# Patient Record
Sex: MALE | Race: WHITE | NOT HISPANIC OR LATINO | Employment: OTHER | URBAN - METROPOLITAN AREA
[De-identification: names, ages, dates, MRNs, and addresses within clinical notes are randomized per-mention and may not be internally consistent; named-entity substitution may affect disease eponyms.]

---

## 2023-03-15 PROBLEM — M21.961 ACQUIRED DEFORMITY OF RIGHT ANKLE AND FOOT: Status: ACTIVE | Noted: 2023-03-15

## 2023-03-15 PROBLEM — M54.50 CHRONIC LOW BACK PAIN: Status: ACTIVE | Noted: 2023-03-15

## 2023-03-15 PROBLEM — M79.641 HAND PAIN, RIGHT: Status: ACTIVE | Noted: 2023-03-15

## 2023-03-15 PROBLEM — M79.89 SWELLING OF RIGHT EXTREMITY: Status: ACTIVE | Noted: 2023-03-15

## 2023-03-15 PROBLEM — G89.29 CHRONIC LOW BACK PAIN: Status: ACTIVE | Noted: 2023-03-15

## 2023-03-15 PROBLEM — J98.4 LUNG ABNORMALITY: Status: ACTIVE | Noted: 2023-03-15

## 2023-03-15 PROBLEM — R20.2 HAND PARESTHESIA: Status: ACTIVE | Noted: 2023-03-15

## 2023-03-15 PROBLEM — I25.10 CAD (CORONARY ARTERY DISEASE): Status: ACTIVE | Noted: 2023-03-15

## 2023-03-15 PROBLEM — M25.371 OTHER INSTABILITY, RIGHT ANKLE: Status: ACTIVE | Noted: 2023-03-15

## 2023-03-15 PROBLEM — M19.071: Status: ACTIVE | Noted: 2023-03-15

## 2023-03-15 PROBLEM — M25.579 ANKLE PAIN, CHRONIC: Status: ACTIVE | Noted: 2023-03-15
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
## 2023-03-15 PROBLEM — Q66.70 CAVUS DEFORMITY: Status: ACTIVE | Noted: 2023-03-15

## 2023-03-15 PROBLEM — C61 PROSTATE CANCER (MULTI): Status: ACTIVE | Noted: 2023-03-15

## 2023-03-15 PROBLEM — M25.461 PREPATELLAR EFFUSION OF RIGHT KNEE: Status: ACTIVE | Noted: 2023-03-15

## 2023-03-15 PROBLEM — R26.81 GAIT INSTABILITY: Status: ACTIVE | Noted: 2023-03-15

## 2023-03-15 PROBLEM — Q66.10 CAVOVARUS DEFORMITY OF FOOT: Status: ACTIVE | Noted: 2023-03-15

## 2023-03-15 PROBLEM — G89.29 ANKLE PAIN, CHRONIC: Status: ACTIVE | Noted: 2023-03-15
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
## 2023-03-15 PROBLEM — R97.20 ELEVATED PSA: Status: ACTIVE | Noted: 2023-03-15

## 2023-03-15 PROBLEM — C61 ADENOCARCINOMA OF PROSTATE (MULTI): Status: ACTIVE | Noted: 2023-03-15

## 2023-03-15 PROBLEM — G56.03 CARPAL TUNNEL SYNDROME, BILATERAL: Status: ACTIVE | Noted: 2023-03-15

## 2023-03-15 PROBLEM — E78.5 HYPERLIPIDEMIA: Status: ACTIVE | Noted: 2023-03-15

## 2023-03-15 RX ORDER — ATORVASTATIN CALCIUM 10 MG/1
1 TABLET, FILM COATED ORAL DAILY
COMMUNITY
Start: 2020-09-18 | End: 2023-03-17 | Stop reason: SDUPTHER

## 2023-03-15 RX ORDER — CHOLECALCIFEROL (VITAMIN D3) 125 MCG
CAPSULE ORAL
COMMUNITY

## 2023-03-15 RX ORDER — NAPROXEN SODIUM 220 MG/1
1 TABLET, FILM COATED ORAL DAILY
COMMUNITY
Start: 2022-09-06

## 2023-03-15 RX ORDER — MULTIVITAMIN
TABLET ORAL
COMMUNITY

## 2023-03-17 ENCOUNTER — OFFICE VISIT (OUTPATIENT)
Dept: PRIMARY CARE | Facility: CLINIC | Age: 78
End: 2023-03-17
Payer: MEDICARE

## 2023-03-17 VITALS
SYSTOLIC BLOOD PRESSURE: 150 MMHG | BODY MASS INDEX: 29.41 KG/M2 | DIASTOLIC BLOOD PRESSURE: 90 MMHG | HEIGHT: 66 IN | WEIGHT: 183 LBS

## 2023-03-17 DIAGNOSIS — Z91.89 AT RISK FOR IMPAIRED HEALTH MAINTENANCE: ICD-10-CM

## 2023-03-17 DIAGNOSIS — F17.210 CIGARETTE NICOTINE DEPENDENCE WITHOUT COMPLICATION: Primary | ICD-10-CM

## 2023-03-17 DIAGNOSIS — R26.81 GAIT INSTABILITY: ICD-10-CM

## 2023-03-17 DIAGNOSIS — Z00.00 HEALTHCARE MAINTENANCE: ICD-10-CM

## 2023-03-17 PROCEDURE — 1036F TOBACCO NON-USER: CPT | Performed by: INTERNAL MEDICINE

## 2023-03-17 PROCEDURE — 99214 OFFICE O/P EST MOD 30 MIN: CPT | Performed by: INTERNAL MEDICINE

## 2023-03-17 PROCEDURE — 1159F MED LIST DOCD IN RCRD: CPT | Performed by: INTERNAL MEDICINE

## 2023-03-17 RX ORDER — ATORVASTATIN CALCIUM 10 MG/1
10 TABLET, FILM COATED ORAL DAILY
Qty: 90 TABLET | Refills: 1 | Status: SHIPPED | OUTPATIENT
Start: 2023-03-17 | End: 2023-10-27 | Stop reason: SDUPTHER

## 2023-03-17 RX ORDER — SILDENAFIL CITRATE 20 MG/1
1 TABLET ORAL DAILY
COMMUNITY
Start: 2022-05-23 | End: 2023-03-17 | Stop reason: ALTCHOICE

## 2023-03-17 RX ORDER — HYDROCODONE BITARTRATE AND ACETAMINOPHEN 5; 325 MG/1; MG/1
TABLET ORAL EVERY 6 HOURS
COMMUNITY
Start: 2021-04-14 | End: 2023-03-17 | Stop reason: ALTCHOICE

## 2023-03-17 NOTE — PROGRESS NOTES
"Subjective   Patient ID: Kaleb Cole is a 77 y.o. male who presents for Follow-up and Med Refill (CAT SCAN FOR LUNGS AND NEEDS SCRIPT FOR NEW ORTHOTIC).  HPI  Patient here for follow-up only issue seems chronic right ankle instability for years after ankle surgery revisions unsuccessful grade 8 out of 10 worse with his present orthotic he wants a new orthotic  He gets some aches in his other joints including knees since this 1 is not working              Health Maintenance:      Colonoscopy:      Mammogram:      Pelvic/Pap:      Low dose chest CT:      Aorta duplex:      Optho:      Podiatry:        Vaccines:      Prevnar 20:      Prevnar 13:      Pneumovax 23:      Tdap:      Shingrix:      COVID:      Influenza:        ROS:      General: denies fever/chills/weight loss      Head: denies HA/trauma/masses/dizziness      Eyes: denies vision change/loss of vision/blurry vision/diplopia/eye pain      Ears: denies hearing loss/tinnitus/otalgia/otorrhea      Nose: denies nasal drainage/anosmia      Throat: denies dysphagia/odynophagia      Lymphatics: denies lymph node swelling      Cardiac: denies CP/palpitations/orthopnea/PND      Pulmonary: denies dyspnea/cough/wheezing      GI: denies abd pain/n/v/diarrhea/melena/hematochezia/hematemesis      : denies dysuria/hematuria/change frequency      Genital: denies genital discharge/lesions      Skin: denies rashes/lesions/masses      MSK: 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 Negative APD     Ears: TM pearly gray, EAC clear. No lesions or erythema     Nose: symmetric nares, " no discharge     Throat: trachea midline, uvula midline pink mucosa. No thyromegaly     Lymphatics: no cervical/supraclavicular/ant or posterior cervical adenopathy/axillary/inguinal adenopathy     Breast: not examined     Chest: no deformity or tenderness to palpation     Pulm: CTA b/l, no wheeze/rhonchi/rales. nonlabored     Cardiac: RRR +s1s2, no m/r/g.      GI: soft, NT/ND. Normoactive Bsx4. No rebound/guarding.     Rectal: no examined     MSK: Deformity right ankle with eversion orthotic in place 5/5 strength UE LE. No edema/clubbing/cyanosis     Skin: no rashes/lesions     Vascular: 2+ palp DP PT radials b/l. Negative carotid bruit     Neuro: CNII-XII intact. No focal deficits. Reflexes 2/4 brachioradialis bicep tricep patellar achilles. Finger to nose intact.     Psych: appropriate mood/affect                    No results found for: BMPR1A, CBCDIF    Patient refused knee x-rays today  Assessment/Plan   Diagnoses and all orders for this visit:  Cigarette nicotine dependence without complication  -     atorvastatin (Lipitor) 10 mg tablet; Take 1 tablet (10 mg) by mouth once daily. Needs Appt for further refills  -     CT lung screening low dose; Future  Healthcare maintenance  -     CBC and Auto Differential; Future  -     Comprehensive Metabolic Panel; Future  -     Lipid panel; Future  -     T4, free; Future  -     TSH; Future  -     Hemoglobin A1C; Future  At risk for impaired health maintenance  -     CBC and Auto Differential; Future  Gait instability  Prescription given for orthotic for Nostalgia Bingo     Thank you for making appointment today Kaleb    Please follow-up in 3 months    VANDAAN Stewart UP AND NEEDS CAT SCAN OF LUNG AND NEEDS SCRIPT FOR A NEW ORTHOTIC

## 2023-03-20 ENCOUNTER — LAB (OUTPATIENT)
Dept: LAB | Facility: LAB | Age: 78
End: 2023-03-20
Payer: MEDICARE

## 2023-03-20 DIAGNOSIS — Z91.89 AT RISK FOR IMPAIRED HEALTH MAINTENANCE: ICD-10-CM

## 2023-03-20 DIAGNOSIS — Z00.00 HEALTHCARE MAINTENANCE: ICD-10-CM

## 2023-03-20 LAB
BASOPHILS (10*3/UL) IN BLOOD BY AUTOMATED COUNT: 0.03 X10E9/L (ref 0–0.1)
BASOPHILS/100 LEUKOCYTES IN BLOOD BY AUTOMATED COUNT: 0.3 % (ref 0–2)
EOSINOPHILS (10*3/UL) IN BLOOD BY AUTOMATED COUNT: 0.14 X10E9/L (ref 0–0.4)
EOSINOPHILS/100 LEUKOCYTES IN BLOOD BY AUTOMATED COUNT: 1.6 % (ref 0–6)
ERYTHROCYTE DISTRIBUTION WIDTH (RATIO) BY AUTOMATED COUNT: 14 % (ref 11.5–14.5)
ERYTHROCYTE MEAN CORPUSCULAR HEMOGLOBIN CONCENTRATION (G/DL) BY AUTOMATED: 31.8 G/DL (ref 32–36)
ERYTHROCYTE MEAN CORPUSCULAR VOLUME (FL) BY AUTOMATED COUNT: 95 FL (ref 80–100)
ERYTHROCYTES (10*6/UL) IN BLOOD BY AUTOMATED COUNT: 4.23 X10E12/L (ref 4.5–5.9)
HEMATOCRIT (%) IN BLOOD BY AUTOMATED COUNT: 40 % (ref 41–52)
HEMOGLOBIN (G/DL) IN BLOOD: 12.7 G/DL (ref 13.5–17.5)
IMMATURE GRANULOCYTES/100 LEUKOCYTES IN BLOOD BY AUTOMATED COUNT: 0.5 % (ref 0–0.9)
LEUKOCYTES (10*3/UL) IN BLOOD BY AUTOMATED COUNT: 8.8 X10E9/L (ref 4.4–11.3)
LYMPHOCYTES (10*3/UL) IN BLOOD BY AUTOMATED COUNT: 1.58 X10E9/L (ref 0.8–3)
LYMPHOCYTES/100 LEUKOCYTES IN BLOOD BY AUTOMATED COUNT: 17.9 % (ref 13–44)
MONOCYTES (10*3/UL) IN BLOOD BY AUTOMATED COUNT: 0.8 X10E9/L (ref 0.05–0.8)
MONOCYTES/100 LEUKOCYTES IN BLOOD BY AUTOMATED COUNT: 9 % (ref 2–10)
NEUTROPHILS (10*3/UL) IN BLOOD BY AUTOMATED COUNT: 6.25 X10E9/L (ref 1.6–5.5)
NEUTROPHILS/100 LEUKOCYTES IN BLOOD BY AUTOMATED COUNT: 70.7 % (ref 40–80)
PLATELETS (10*3/UL) IN BLOOD AUTOMATED COUNT: 343 X10E9/L (ref 150–450)

## 2023-03-20 PROCEDURE — 36415 COLL VENOUS BLD VENIPUNCTURE: CPT

## 2023-03-20 PROCEDURE — 80053 COMPREHEN METABOLIC PANEL: CPT

## 2023-03-20 PROCEDURE — 85025 COMPLETE CBC W/AUTO DIFF WBC: CPT

## 2023-03-20 PROCEDURE — 84439 ASSAY OF FREE THYROXINE: CPT

## 2023-03-20 PROCEDURE — 84443 ASSAY THYROID STIM HORMONE: CPT

## 2023-03-20 PROCEDURE — 80061 LIPID PANEL: CPT

## 2023-03-20 PROCEDURE — 83036 HEMOGLOBIN GLYCOSYLATED A1C: CPT

## 2023-03-21 DIAGNOSIS — D64.9 ANEMIA, UNSPECIFIED TYPE: Primary | ICD-10-CM

## 2023-03-21 LAB
ALANINE AMINOTRANSFERASE (SGPT) (U/L) IN SER/PLAS: 13 U/L (ref 10–52)
ALBUMIN (G/DL) IN SER/PLAS: 3.9 G/DL (ref 3.4–5)
ALKALINE PHOSPHATASE (U/L) IN SER/PLAS: 46 U/L (ref 33–136)
ANION GAP IN SER/PLAS: 13 MMOL/L (ref 10–20)
ASPARTATE AMINOTRANSFERASE (SGOT) (U/L) IN SER/PLAS: 15 U/L (ref 9–39)
BILIRUBIN TOTAL (MG/DL) IN SER/PLAS: 0.4 MG/DL (ref 0–1.2)
CALCIUM (MG/DL) IN SER/PLAS: 9 MG/DL (ref 8.6–10.3)
CARBON DIOXIDE, TOTAL (MMOL/L) IN SER/PLAS: 28 MMOL/L (ref 21–32)
CHLORIDE (MMOL/L) IN SER/PLAS: 103 MMOL/L (ref 98–107)
CHOLESTEROL (MG/DL) IN SER/PLAS: 133 MG/DL (ref 0–199)
CHOLESTEROL IN HDL (MG/DL) IN SER/PLAS: 66.3 MG/DL
CHOLESTEROL/HDL RATIO: 2
CREATININE (MG/DL) IN SER/PLAS: 0.71 MG/DL (ref 0.5–1.3)
ESTIMATED AVERAGE GLUCOSE FOR HBA1C: 117 MG/DL
GFR MALE: >90 ML/MIN/1.73M2
GLUCOSE (MG/DL) IN SER/PLAS: 87 MG/DL (ref 74–99)
HEMOGLOBIN A1C/HEMOGLOBIN TOTAL IN BLOOD: 5.7 %
LDL: 54 MG/DL (ref 0–99)
POTASSIUM (MMOL/L) IN SER/PLAS: 4.3 MMOL/L (ref 3.5–5.3)
PROTEIN TOTAL: 6.1 G/DL (ref 6.4–8.2)
SODIUM (MMOL/L) IN SER/PLAS: 140 MMOL/L (ref 136–145)
THYROTROPIN (MIU/L) IN SER/PLAS BY DETECTION LIMIT <= 0.05 MIU/L: 2.88 MIU/L (ref 0.44–3.98)
THYROXINE (T4) FREE (NG/DL) IN SER/PLAS: 1.01 NG/DL (ref 0.61–1.12)
TRIGLYCERIDE (MG/DL) IN SER/PLAS: 63 MG/DL (ref 0–149)
UREA NITROGEN (MG/DL) IN SER/PLAS: 14 MG/DL (ref 6–23)
VLDL: 13 MG/DL (ref 0–40)

## 2023-03-22 DIAGNOSIS — R93.1 HIGH CORONARY ARTERY CALCIUM SCORE: Primary | ICD-10-CM

## 2023-04-26 ENCOUNTER — OFFICE VISIT (OUTPATIENT)
Dept: PRIMARY CARE | Facility: CLINIC | Age: 78
End: 2023-04-26
Payer: MEDICARE

## 2023-04-26 VITALS — WEIGHT: 173 LBS | SYSTOLIC BLOOD PRESSURE: 132 MMHG | BODY MASS INDEX: 27.92 KG/M2 | DIASTOLIC BLOOD PRESSURE: 84 MMHG

## 2023-04-26 DIAGNOSIS — M25.562 PAIN IN BOTH KNEES, UNSPECIFIED CHRONICITY: ICD-10-CM

## 2023-04-26 DIAGNOSIS — M79.605 PAIN IN BOTH LOWER EXTREMITIES: ICD-10-CM

## 2023-04-26 DIAGNOSIS — M25.561 PAIN IN BOTH KNEES, UNSPECIFIED CHRONICITY: ICD-10-CM

## 2023-04-26 DIAGNOSIS — R60.0 LEG EDEMA: Primary | ICD-10-CM

## 2023-04-26 DIAGNOSIS — M25.552 BILATERAL HIP PAIN: ICD-10-CM

## 2023-04-26 DIAGNOSIS — M79.604 PAIN IN BOTH LOWER EXTREMITIES: ICD-10-CM

## 2023-04-26 DIAGNOSIS — D64.9 ANEMIA, UNSPECIFIED TYPE: ICD-10-CM

## 2023-04-26 DIAGNOSIS — M25.551 BILATERAL HIP PAIN: ICD-10-CM

## 2023-04-26 PROBLEM — R93.1 AGATSTON CORONARY ARTERY CALCIUM SCORE GREATER THAN 400: Status: ACTIVE | Noted: 2023-04-26

## 2023-04-26 PROBLEM — R06.09 DOE (DYSPNEA ON EXERTION): Status: ACTIVE | Noted: 2023-04-26

## 2023-04-26 PROBLEM — E78.5 HLD (HYPERLIPIDEMIA): Status: ACTIVE | Noted: 2023-04-26

## 2023-04-26 PROBLEM — M79.643 HAND PAIN: Status: ACTIVE | Noted: 2023-04-26

## 2023-04-26 PROBLEM — I73.9 CLAUDICATION, INTERMITTENT (CMS-HCC): Status: ACTIVE | Noted: 2023-04-26

## 2023-04-26 PROCEDURE — 1036F TOBACCO NON-USER: CPT | Performed by: INTERNAL MEDICINE

## 2023-04-26 PROCEDURE — 1159F MED LIST DOCD IN RCRD: CPT | Performed by: INTERNAL MEDICINE

## 2023-04-26 PROCEDURE — 99214 OFFICE O/P EST MOD 30 MIN: CPT | Performed by: INTERNAL MEDICINE

## 2023-04-26 RX ORDER — PREDNISONE 20 MG/1
20 TABLET ORAL DAILY
Qty: 5 TABLET | Refills: 2 | Status: SHIPPED | OUTPATIENT
Start: 2023-04-26 | End: 2023-05-01

## 2023-04-26 ASSESSMENT — ENCOUNTER SYMPTOMS
OCCASIONAL FEELINGS OF UNSTEADINESS: 1
LOSS OF SENSATION IN FEET: 0
DEPRESSION: 0

## 2023-04-26 NOTE — PROGRESS NOTES
Subjective   Patient ID: Kaleb Cole is a 77 y.o. male who presents for Leg Swelling and Temporomandibular Joint Pain.  HPI        Patient complains of leg swelling for the last month or so getting worse grade 7 out of 10 nothing makes better or worse  Had associated pain in his calf muscles back of his thighs  Severe arthralgias in his hips and knees  Feels a lot of stiffness  Denies chest pain dyspnea nausea vomiting joint redness fever chills trauma bowel urinary incontinence saddle paresthesias        Health Maintenance:      Colonoscopy:      Mammogram:      Pelvic/Pap:      Low dose chest CT:      Aorta duplex:      Optho:      Podiatry:        Vaccines:      Prevnar 20:      Prevnar 13:      Pneumovax 23:      Tdap:      Shingrix:      COVID:      Influenza:        ROS:      General: denies fever/chills/weight loss      Head: denies HA/trauma/masses/dizziness      Eyes: denies vision change/loss of vision/blurry vision/diplopia/eye pain      Ears: denies hearing loss/tinnitus/otalgia/otorrhea      Nose: denies nasal drainage/anosmia      Throat: denies dysphagia/odynophagia      Lymphatics: denies lymph node swelling      Cardiac: denies CP/palpitations/orthopnea/PND      Pulmonary: denies dyspnea/cough/wheezing      GI: denies abd pain/n/v/diarrhea/melena/hematochezia/hematemesis      : denies dysuria/hematuria/change frequency      Genital: denies genital discharge/lesions      Skin: denies rashes/lesions/masses      MSK: Worsening bilateral leg swelling with severe arthralgias hips knees denies weakness/swelling/edema/gait imbalance/pain      Neuro: denies paresthesias/seizures/dysarthria      Psych: denies depression/anxiety/suicidal or homicidal ideations            Objective   /84   Wt 78.5 kg (173 lb)   BMI 27.92 kg/m²      Physical Exam:     General: AO3, NAD     Head: atraumatic/NC     Eyes: EOMI/PERRLA. Negative APD     Ears: TM pearly gray, EAC clear. No lesions or erythema     Nose:  symmetric nares, no discharge     Throat: trachea midline, uvula midline pink mucosa. No thyromegaly     Lymphatics: no cervical/supraclavicular/ant or posterior cervical adenopathy/axillary/inguinal adenopathy     Breast: not examined     Chest: no deformity or tenderness to palpation     Pulm: CTA b/l, no wheeze/rhonchi/rales. nonlabored     Cardiac: RRR +s1s2, no m/r/g.      GI: soft, NT/ND. Normoactive Bsx4. No rebound/guarding.     Rectal: no examined     MSK: Trace pitting edema bilateral scant negative Homans right ankle brace intact stabilizer 5/5 strength UE LE. No edema/clubbing/cyanosis     Skin: no rashes/lesions     Vascular: 2+ palp DP PT radials b/l. Negative carotid bruit     Neuro: CNII-XII intact. No focal deficits. Reflexes 2/4 brachioradialis bicep tricep patellar achilles. Finger to nose intact.     Psych: appropriate mood/affect                    No results found for: BMPR1A, CBCDIF      Assessment/Plan   Diagnoses and all orders for this visit:  Leg edema  Comments:  Bilateral undiagnosed CHF inflammatory arthritis rule out DVT versus other venous insufficiency  Orders:  -     Vascular US lower extremity venous duplex bilateral; Future  -     Vascular US lower extremity venous insufficiency bilateral; Future  Pain in both lower extremities  Comments:  Statin myopathy versus osteoarthritis versus inflammatory arthropathy less likely lumbar radiculopathy vs dvt  Orders:  -     Vascular US lower extremity venous duplex bilateral; Future  -     Vascular US lower extremity venous insufficiency bilateral; Future  -     predniSONE (Deltasone) 20 mg tablet; Take 1 tablet (20 mg) by mouth once daily for 5 days.  Bilateral hip pain  -     XR hips bilateral 2 VW w or wo pelvis; Future  -     predniSONE (Deltasone) 20 mg tablet; Take 1 tablet (20 mg) by mouth once daily for 5 days.  Pain in both knees, unspecified chronicity  -     XR knee 4+ views bilateral; Future  -     NAPOLEON with Reflex to RAMONA;  Future  -     Uric acid; Future  -     predniSONE (Deltasone) 20 mg tablet; Take 1 tablet (20 mg) by mouth once daily for 5 days.  Anemia, unspecified type  -     Iron and TIBC; Future  -     Reticulocytes; Future  -     Haptoglobin; Future  -     Vitamin B12; Future  -     Folate; Future  Recommend completing cardiology orders as well stress test echo vascular PVR  Go to the ER for any new or worsening symptoms  As we discussed if above work-up unrevealing may be left with changing statin would have to discuss with cardiology given your cardiac calcifications    Thank you for making appointment today Kaleb    Screening blood work March 2024    Please follow-up in 2 months         Yazan Stewart swelling and joint pain 2xweeks

## 2023-04-27 ENCOUNTER — LAB (OUTPATIENT)
Dept: LAB | Facility: LAB | Age: 78
End: 2023-04-27
Payer: MEDICARE

## 2023-04-27 DIAGNOSIS — M25.562 PAIN IN BOTH KNEES, UNSPECIFIED CHRONICITY: ICD-10-CM

## 2023-04-27 DIAGNOSIS — D64.9 ANEMIA, UNSPECIFIED TYPE: ICD-10-CM

## 2023-04-27 DIAGNOSIS — M25.561 PAIN IN BOTH KNEES, UNSPECIFIED CHRONICITY: ICD-10-CM

## 2023-04-27 LAB
COBALAMIN (VITAMIN B12) (PG/ML) IN SER/PLAS: 786 PG/ML (ref 211–911)
FOLATE (NG/ML) IN SER/PLAS: 19.9 NG/ML
HEMOGLOBIN (PG) IN RETICULOCYTES: 32 PG (ref 28–38)
IMMATURE RETIC FRACTION: 10 % (ref 0–16)
RETICULOCYTES (10*3/UL) IN BLOOD: 0.06 X10E12/L (ref 0.02–0.11)
RETICULOCYTES/100 ERYTHROCYTES IN BLOOD BY AUTOMATED COUNT: 1.4 % (ref 0.5–2)

## 2023-04-27 PROCEDURE — 82607 VITAMIN B-12: CPT

## 2023-04-27 PROCEDURE — 84550 ASSAY OF BLOOD/URIC ACID: CPT

## 2023-04-27 PROCEDURE — 82746 ASSAY OF FOLIC ACID SERUM: CPT

## 2023-04-27 PROCEDURE — 83010 ASSAY OF HAPTOGLOBIN QUANT: CPT

## 2023-04-27 PROCEDURE — 36415 COLL VENOUS BLD VENIPUNCTURE: CPT

## 2023-04-27 PROCEDURE — 83540 ASSAY OF IRON: CPT

## 2023-04-27 PROCEDURE — 86038 ANTINUCLEAR ANTIBODIES: CPT

## 2023-04-27 PROCEDURE — 83550 IRON BINDING TEST: CPT

## 2023-04-27 PROCEDURE — 85045 AUTOMATED RETICULOCYTE COUNT: CPT

## 2023-04-28 LAB
ANTI-NUCLEAR ANTIBODY (ANA): NEGATIVE
HAPTOGLOBIN (MG/DL) IN SER/PLAS: 315 MG/DL (ref 30–200)
IRON (UG/DL) IN SER/PLAS: 40 UG/DL (ref 35–150)
IRON BINDING CAPACITY (UG/DL) IN SER/PLAS: 245 UG/DL (ref 240–445)
IRON SATURATION (%) IN SER/PLAS: 16 % (ref 25–45)
URATE (MG/DL) IN SER/PLAS: 4.5 MG/DL (ref 4–7.5)

## 2023-05-03 DIAGNOSIS — M25.561 CHRONIC PAIN OF BOTH KNEES: Primary | ICD-10-CM

## 2023-05-03 DIAGNOSIS — G89.29 CHRONIC PAIN OF BOTH KNEES: Primary | ICD-10-CM

## 2023-05-03 DIAGNOSIS — M25.562 CHRONIC PAIN OF BOTH KNEES: Primary | ICD-10-CM

## 2023-05-03 LAB — PROSTATE SPECIFIC AG (NG/ML) IN SER/PLAS: 1.3 NG/ML (ref 0–4)

## 2023-05-23 DIAGNOSIS — I87.2 VENOUS REFLUX: Primary | ICD-10-CM

## 2023-05-26 DIAGNOSIS — M25.561 CHRONIC PAIN OF BOTH KNEES: Primary | ICD-10-CM

## 2023-05-26 DIAGNOSIS — G89.29 CHRONIC PAIN OF BOTH KNEES: Primary | ICD-10-CM

## 2023-05-26 DIAGNOSIS — M25.562 CHRONIC PAIN OF BOTH KNEES: Primary | ICD-10-CM

## 2023-10-27 DIAGNOSIS — F17.210 CIGARETTE NICOTINE DEPENDENCE WITHOUT COMPLICATION: ICD-10-CM

## 2023-10-27 RX ORDER — ATORVASTATIN CALCIUM 10 MG/1
10 TABLET, FILM COATED ORAL DAILY
Qty: 21 TABLET | Refills: 0 | Status: SHIPPED | OUTPATIENT
Start: 2023-10-27 | End: 2023-11-20

## 2023-11-03 ENCOUNTER — APPOINTMENT (OUTPATIENT)
Dept: HEMATOLOGY/ONCOLOGY | Facility: CLINIC | Age: 78
End: 2023-11-03
Payer: MEDICARE

## 2023-11-09 ENCOUNTER — TELEPHONE (OUTPATIENT)
Dept: PHARMACY | Facility: HOSPITAL | Age: 78
End: 2023-11-09
Payer: MEDICARE

## 2023-11-09 NOTE — TELEPHONE ENCOUNTER
Population Health: Outreach by Ambulatory Pharmacy Team    Payor: Pepe NOE  Reason: Adherence  Medication: Atorvastatin  Outcome: Left Voicemail    Enma Arevalo, NohemiD

## 2023-11-14 DIAGNOSIS — F17.210 CIGARETTE NICOTINE DEPENDENCE WITHOUT COMPLICATION: ICD-10-CM

## 2023-11-20 RX ORDER — ATORVASTATIN CALCIUM 10 MG/1
TABLET, FILM COATED ORAL
Qty: 30 TABLET | Refills: 0 | Status: SHIPPED | OUTPATIENT
Start: 2023-11-20 | End: 2023-12-12 | Stop reason: SDUPTHER

## 2023-11-20 RX ORDER — ATORVASTATIN CALCIUM 10 MG/1
10 TABLET, FILM COATED ORAL DAILY
Qty: 30 TABLET | Refills: 0 | OUTPATIENT
Start: 2023-11-20 | End: 2023-12-12 | Stop reason: WASHOUT

## 2023-11-20 RX ORDER — ATORVASTATIN CALCIUM 10 MG/1
10 TABLET, FILM COATED ORAL DAILY
Qty: 21 TABLET | Refills: 0 | Status: SHIPPED | OUTPATIENT
Start: 2023-11-20 | End: 2023-12-12 | Stop reason: WASHOUT

## 2023-11-27 ENCOUNTER — LAB (OUTPATIENT)
Dept: LAB | Facility: LAB | Age: 78
End: 2023-11-27
Payer: MEDICARE

## 2023-11-27 DIAGNOSIS — C61 MALIGNANT NEOPLASM OF PROSTATE (MULTI): Primary | ICD-10-CM

## 2023-11-27 LAB — PSA SERPL-MCNC: 1.17 NG/ML

## 2023-11-27 PROCEDURE — 84153 ASSAY OF PSA TOTAL: CPT

## 2023-11-27 PROCEDURE — 36415 COLL VENOUS BLD VENIPUNCTURE: CPT

## 2023-11-30 NOTE — PROGRESS NOTES
Cancer synopsis:  Rad/onc: Weisent CNP    77 y/o male w/ adenocarcinoma of the prostate; Stage IIB; favorable intermediate risk    Treatment Dates: 4/3/2020 thru 4/13/2020  Treatment Site: prostate Treatment Dose: 3625 cGy   Treatment Technique: The patient was treated with SBRT using volumetric arc therapy with 6MV photons. MLCs were used to shield adjacent organs at risk. Fiducials and hydrogel spacer were placed to maximally spare the rectum. MRI fusion was performed for  target delineation. CBCT was used for image guidance. The patient received 5 fractions of 725 cGy without ADT.   Treatment Course: The patient tolerated treatment well with only anticipated side effects. He was started on tamsulosin during the course of treatment due to urgency and dysuria with improvement of his symptoms.     History of presenting illness:    Patient ID: [id number]     Kaleb Cole is a 78 y.o. male who presents for his FIR prostate cancer GS 3+4=7, IPSA <10 now s/p RT.    RT Site: Prostate  RT Date: 04/13/2020  Hormone therapy: No  Hot Flushes: Denies  Fatigue: Denies  Bone pain: Denies  RADHA: n/a virtual  ED: Denies changes in erectile function since RT.  ED medications: No  IPSS: n/a virtual  Urinary symptoms: Denies dysuria, hematuria, frequency or urgency. Denies weak stream. Denies incomplete bladder emptying.  Urinary Medications: No  Rectal bleeding: Denies  Other systems: Denies SOB, CP or fever.      Review of systems:  Review of Systems   Constitutional:  Negative for fatigue, fever and unexpected weight change.   Respiratory:  Negative for cough, chest tightness and shortness of breath.    Cardiovascular:  Negative for chest pain, palpitations and leg swelling.   Gastrointestinal:  Negative for abdominal pain, anal bleeding, blood in stool, constipation, diarrhea and rectal pain.   Endocrine: Negative for cold intolerance, heat intolerance and polyuria.   Genitourinary:  Negative for decreased urine volume,  difficulty urinating, dysuria, frequency, hematuria and urgency.   Musculoskeletal:  Negative for arthralgias, back pain, gait problem and joint swelling.   Skin: Negative.    Allergic/Immunologic: Negative.    Neurological:  Negative for dizziness, syncope and weakness.   Psychiatric/Behavioral: Negative.         Past Medical history  Past Medical History:   Diagnosis Date    Body mass index (BMI) 27.0-27.9, adult     BMI 27.0-27.9,adult    Carpal tunnel syndrome, left upper limb 05/25/2021    Carpal tunnel syndrome of left wrist    Personal history of other diseases of the circulatory system 02/13/2014    Personal history of coronary atherosclerosis        Surgical/family history  Family History   Problem Relation Name Age of Onset    Lung cancer Father        Past Surgical History:   Procedure Laterality Date    ANKLE ARTHROSCOPY W/ OPEN REPAIR  08/12/2016    Ankle Repair    KNEE ARTHROSCOPY W/ DEBRIDEMENT  02/06/2015    Arthroscopy Knee Right    OTHER SURGICAL HISTORY  02/06/2015    Leg Repair        Social History  Tobacco Use: Medium Risk (4/26/2023)    Patient History     Smoking Tobacco Use: Former     Smokeless Tobacco Use: Former     Passive Exposure: Not on file         Current med list:  Current Outpatient Medications   Medication Instructions    aspirin 81 mg chewable tablet 1 tablet, oral, Daily    atorvastatin (Lipitor) 10 mg tablet TAKE 1 TABLET BY MOUTH ONCE DAILY . APPOINTMENT REQUIRED FOR FUTURE REFILLS    atorvastatin (LIPITOR) 10 mg, oral, Daily, Needs Appt for further refills    atorvastatin (LIPITOR) 10 mg, oral, Daily    multivitamin tablet oral    naproxen sodium 220 mg capsule oral        Last recorded vital:  N/a virtual    Physical exam  N/a virtual    Pertinent labs:  Prostate Specific AG   Date/Time Value Ref Range Status   11/27/2023 01:52 PM 1.17 <=4.00 ng/mL Final         Dx:  Problem List Items Addressed This Visit    None  Visit Diagnoses       Malignant neoplasm of prostate  (CMS/[facility])    -  Primary    Relevant Orders    Clinic Appointment Request Follow Up; [facility]RIST[facility]ELASQUE[facility] (virtual); [facility] (virtual)    Prostate Specific Antigen           PSA of 1.17 was reviewed and is declining nicely. Review of latent SE including rectal bleeding, hematuria, urinary strictures, ED where reviewed as well as how to contact office if s/s present. Denies latent SE. NCCN guidelines where reviewed and routine FUV of every 3m for first year and every 6m for four years for a total of five years was discussed. Patient verbalized understanding.        PLAN:  FUV 6m virtual  Labs PSA in 6m  Imaging none  FUV other providers: PCP for routine evals,         Please contact office with any concerns:  Christiano Bowser CNP  [phone]

## 2023-12-01 ENCOUNTER — HOSPITAL ENCOUNTER (OUTPATIENT)
Dept: RADIATION ONCOLOGY | Facility: HOSPITAL | Age: 78
Setting detail: RADIATION/ONCOLOGY SERIES
Discharge: HOME | End: 2023-12-01
Payer: MEDICARE

## 2023-12-01 DIAGNOSIS — C61 MALIGNANT NEOPLASM OF PROSTATE (MULTI): Primary | ICD-10-CM

## 2023-12-01 PROCEDURE — 99213 OFFICE O/P EST LOW 20 MIN: CPT

## 2023-12-01 ASSESSMENT — ENCOUNTER SYMPTOMS
PSYCHIATRIC NEGATIVE: 1
DYSURIA: 0
RECTAL PAIN: 0
WEAKNESS: 0
FATIGUE: 0
HEMATURIA: 0
JOINT SWELLING: 0
DIARRHEA: 0
CONSTIPATION: 0
DIFFICULTY URINATING: 0
BLOOD IN STOOL: 0
FREQUENCY: 0
SHORTNESS OF BREATH: 0
FEVER: 0
UNEXPECTED WEIGHT CHANGE: 0
ABDOMINAL PAIN: 0
CHEST TIGHTNESS: 0
PALPITATIONS: 0
DIZZINESS: 0
COUGH: 0
ANAL BLEEDING: 0
ALLERGIC/IMMUNOLOGIC NEGATIVE: 1
BACK PAIN: 0
ARTHRALGIAS: 0

## 2023-12-12 ENCOUNTER — OFFICE VISIT (OUTPATIENT)
Dept: PRIMARY CARE | Facility: CLINIC | Age: 78
End: 2023-12-12
Payer: MEDICARE

## 2023-12-12 VITALS
HEART RATE: 79 BPM | HEIGHT: 65 IN | BODY MASS INDEX: 28.66 KG/M2 | OXYGEN SATURATION: 98 % | SYSTOLIC BLOOD PRESSURE: 130 MMHG | DIASTOLIC BLOOD PRESSURE: 82 MMHG | WEIGHT: 172 LBS

## 2023-12-12 DIAGNOSIS — Z00.00 HEALTHCARE MAINTENANCE: Primary | ICD-10-CM

## 2023-12-12 DIAGNOSIS — F17.210 CIGARETTE NICOTINE DEPENDENCE WITHOUT COMPLICATION: ICD-10-CM

## 2023-12-12 DIAGNOSIS — I73.9 CLAUDICATION, INTERMITTENT (CMS-HCC): ICD-10-CM

## 2023-12-12 DIAGNOSIS — Z91.89 AT RISK FOR IMPAIRED HEALTH MAINTENANCE: ICD-10-CM

## 2023-12-12 DIAGNOSIS — E55.9 VITAMIN D DEFICIENCY: ICD-10-CM

## 2023-12-12 DIAGNOSIS — M25.371 INSTABILITY OF RIGHT ANKLE JOINT: ICD-10-CM

## 2023-12-12 PROBLEM — M25.562 BILATERAL KNEE PAIN: Status: RESOLVED | Noted: 2023-12-12 | Resolved: 2023-12-12

## 2023-12-12 PROBLEM — M17.12 ARTHRITIS OF LEFT KNEE: Status: ACTIVE | Noted: 2023-03-15

## 2023-12-12 PROBLEM — R60.0 EDEMA OF LOWER EXTREMITY: Status: RESOLVED | Noted: 2023-12-12 | Resolved: 2023-12-12

## 2023-12-12 PROBLEM — M25.561 BILATERAL KNEE PAIN: Status: RESOLVED | Noted: 2023-12-12 | Resolved: 2023-12-12

## 2023-12-12 PROBLEM — M21.961 FOOT DEFORMITY, ACQUIRED, RIGHT: Status: RESOLVED | Noted: 2022-03-21 | Resolved: 2023-12-12

## 2023-12-12 PROBLEM — M19.079 ARTHRITIS OF FOOT: Status: ACTIVE | Noted: 2022-03-21

## 2023-12-12 PROCEDURE — 1159F MED LIST DOCD IN RCRD: CPT | Performed by: INTERNAL MEDICINE

## 2023-12-12 PROCEDURE — 99397 PER PM REEVAL EST PAT 65+ YR: CPT | Performed by: INTERNAL MEDICINE

## 2023-12-12 PROCEDURE — 1036F TOBACCO NON-USER: CPT | Performed by: INTERNAL MEDICINE

## 2023-12-12 PROCEDURE — G0439 PPPS, SUBSEQ VISIT: HCPCS | Performed by: INTERNAL MEDICINE

## 2023-12-12 PROCEDURE — 1170F FXNL STATUS ASSESSED: CPT | Performed by: INTERNAL MEDICINE

## 2023-12-12 RX ORDER — ATORVASTATIN CALCIUM 10 MG/1
10 TABLET, FILM COATED ORAL DAILY
Qty: 90 TABLET | Refills: 1 | Status: SHIPPED | OUTPATIENT
Start: 2023-12-12 | End: 2024-06-07

## 2023-12-12 ASSESSMENT — ACTIVITIES OF DAILY LIVING (ADL)
BATHING: INDEPENDENT
DOING_HOUSEWORK: INDEPENDENT
GROCERY_SHOPPING: INDEPENDENT
MANAGING_FINANCES: INDEPENDENT
TAKING_MEDICATION: INDEPENDENT
DRESSING: INDEPENDENT

## 2023-12-12 ASSESSMENT — ENCOUNTER SYMPTOMS
DEPRESSION: 0
OCCASIONAL FEELINGS OF UNSTEADINESS: 1
LOSS OF SENSATION IN FEET: 0

## 2023-12-12 ASSESSMENT — PATIENT HEALTH QUESTIONNAIRE - PHQ9
2. FEELING DOWN, DEPRESSED OR HOPELESS: NOT AT ALL
SUM OF ALL RESPONSES TO PHQ9 QUESTIONS 1 AND 2: 0
1. LITTLE INTEREST OR PLEASURE IN DOING THINGS: NOT AT ALL

## 2023-12-12 NOTE — PROGRESS NOTES
Chief Complaint: Medicare Wellness Exam/Comprehensive Problem Focused Follow Up and Physical Exam    HPI:  male with a PMH of Prostate adenocarcinoma stage IIB status post radiation therapy, HLD, osteoarthritis, and CAD.  Right ankle instability status post surgery x 2  Status post left carpal tunnel surgery 2021    Patient overall doing okay aside from chronic right ankle instability for many years grade 8 out of 10 worse after surgery x 2 the day and go as planned unfortunately he is using orthotic in the past but may need a new one  Gait instability  Denies swelling redness paralysis paresthesias    Active Problem List      Comprehensive Medical/Surgical/Social/Family History  Past Medical History:   Diagnosis Date    Bilateral knee pain 12/12/2023    Body mass index (BMI) 27.0-27.9, adult     BMI 27.0-27.9,adult    Carpal tunnel syndrome, left upper limb 05/25/2021    Carpal tunnel syndrome of left wrist    Edema of lower extremity 12/12/2023    Personal history of other diseases of the circulatory system 02/13/2014    Personal history of coronary atherosclerosis     Past Surgical History:   Procedure Laterality Date    ANKLE ARTHROSCOPY W/ OPEN REPAIR  08/12/2016    Ankle Repair    KNEE ARTHROSCOPY W/ DEBRIDEMENT  02/06/2015    Arthroscopy Knee Right    OTHER SURGICAL HISTORY  02/06/2015    Leg Repair     Social History     Social History Narrative    Not on file         Allergies and Medications  Patient has no known allergies.  Current Outpatient Medications on File Prior to Visit   Medication Sig Dispense Refill    aspirin 81 mg chewable tablet Chew 1 tablet (81 mg) once daily.      multivitamin tablet Take by mouth.      naproxen sodium 220 mg capsule Take by mouth.      [DISCONTINUED] atorvastatin (Lipitor) 10 mg tablet Take 1 tablet (10 mg) by mouth once daily. Needs Appt for further refills 21 tablet 0    [DISCONTINUED] atorvastatin (Lipitor) 10 mg tablet Take 1 tablet (10 mg) by mouth once daily. 30  "tablet 0    [DISCONTINUED] atorvastatin (Lipitor) 10 mg tablet TAKE 1 TABLET BY MOUTH ONCE DAILY . APPOINTMENT REQUIRED FOR FUTURE REFILLS 30 tablet 0     No current facility-administered medications on file prior to visit.       Medications and Supplements  prescribed by me and other practitioners or clinical pharmacist (such as prescriptions, OTC's, herbal therapies and supplements) were reviewed and documented in the medical record.    Tobacco/Alcohol/Opioid use, as well as Illicit Drug Use was screened for/reviewed and documented in Social History section and medication list as appropriate  Activities of Daily Living  In your present state of health, do you have any difficulty performing the following activities?:   Preparing food and eating?: No  Bathing yourself: No  Getting dressed: No  Using the toilet:No  Moving around from place to place: No  In the past year have you fallen or had a near fall?:No    Depression Screen  (Note: if answer to either of the following is \"Yes\", then a more complete depression screening is indicated)   Q1: Over the past two weeks, have you felt down, depressed or hopeless? No  Q2: Over the past two weeks, have you felt little interest or pleasure in doing things? No    Current exercise habits: The patient does not participate in regular exercise at present.   Dietary issues discussed: Yes  Hearing difficulties: No  Safe in current home environment: yes  Visual Acuity assessed: no  Cognitive Impairment assessed: no       Advance directives  Advanced Care Planning (including a Living Will, Healthcare POA, as well as specific end of life choices and/or directives), was discussed for approximately 16 minutes with the patient and/or surrogate, voluntarily, and documented in the medical record.     Cardiac Risk Assessment  Cardiovascular risk was discussed and, if needed, lifestyle modifications recommended, including nutritional choices, exercise, and elimination of habits " contributing to risk. We agreed on a plan to reduce the current cardiovascular risk based on above discussion as needed.  Aspirin use/disuse was discussed after reviewing the updated guidelines below:    Consider low dose Aspirin ( mg) use if the benefit for cardiovascular disease prevention outweighs risk for bleeding complications.   In general, low dose ASA should be considered:  In patients WITHOUT prior MI/stroke/PAD (primary prevention):   a. Age <60: Use if 10-year cardiovascular disease risk >20%, with discussion of risks and benefits with patient  b. Age 60-<70: Use if 10-year cardiovascular disease risk >20% and low bleeding (e.g., gastrointenstinal) risk, with discussion of risks and benefits with patient  c. Age >=70: Do not use    In patients WITH prior MI/stroke/PAD (secondary prevention):   Generally use unless extremely high bleeding (e.g., gastrointenstinal) risk, with discussion of risks and benefits with patient    ROS otherwise negative aside from what was mentioned above in HPI.  Health Maintenance  Colonoscopy: []  Mammogram: []  Pap smear/Pelvic Exam: []  DEXA: []  Low dose chest CT: []   Screening Abdominal US: []  Opthalmology: []  Podiatry: []    Immunizations  Influenza: []  Pneumovax: []  Prevnar 13: []  Td/Tdap: []  Zostavax: []            ROS:  Constitutional: [] denies fever/night sweats/weight loss/fatigue/insomnia  Head: [] denies trauma/headache/masses  Eyes: [] denies loss of vision/blurry vision/diplopia/redness/eye pain  Ears: [] denies hearing loss/tinnitus/masses/pain/otorrhea  Nose: [] denies anosmia/masses/epistaxis/drainage  Throat: [] denies dysphagia/odynophagia  Neck: [] denies masses/asymmetry  Lymphatics: [] denies lymph node swelling  Cardiovascular: [] denies CP/orthopnea/PND/leg edema/palpitations  Pulmonary: [] denies SOB/dyspnea with exertion/cough/sputum production/hemoptysis/wheezing  GI: [] denies abdominal  pain/nausea/vomiting/dysphagia/odynophagia/melena/hematochezia/diarrhea/constipation/change in stool caliber/bowel incontinence  : [] denies dysuria/hematuria/increased frequency/nocturia/dribbling/urinary incontinence  Genital: [] denies discharge/masses/lesions  Musculoskeletal: [Chronic right ankle instability deformity hard to walk; occasional stiffness of the hands] denies trauma/arthralgia/myalgia/deformity/joint swelling  Hematologic: [] denies easy bruising or bleeding  Neurologic: [] denies gait imbalance/paresthesias/saddle paresthesia/focal weakness/dysarthria/seizure  Skin: [] denies masses/lesions/rashes/tattoos  Psychiatric: [] denies depression/suicidal or homicidal thoughts    Vitals  Vitals:    12/12/23 1301   BP: 130/82   Pulse:    SpO2:      Body mass index is 29.07 kg/m².  Physical Exam  Gen: Alert, NAD  HEENT:  PERRLA, EOMI, conjunctiva and sclera normal in appearance. External auditory canals/TMs normal; Oral cavity and posterior pharynx without lesions/exudate  Neck:  Supple with FROM; No masses/nodes palpable; Thyroid nontender and without nodules; No ALTA  Respiratory:  Lungs CTAB  Cardiovascular:  Heart RRR. No M/R/G. Peripheral pulses equal bilaterally  Abdomen:  Soft, nontender, BS present throughout; No R/G/R; No HSM or masses palpated  Extremities: Right ankle inversion 30 degrees ; antalgic gait FROM all extremities; Muscle strength grossly normal with good tone 2+ palpable DP PT bilateral lower extremity  Neuro:  CN II-XII intact; Reflexes 2+/2+; Gross motor and sensory intact  Skin:  No suspicious lesions present  Breast: No masses, skin lesions or nipple discharges, no axillary lymphadenopathy    Assessment and Plan:  Problem List Items Addressed This Visit       Claudication, intermittent (CMS/HCC)    Current Assessment & Plan     Improved with exercise encouraged physical activity 30 minutes most days a week          Other Visit Diagnoses       Healthcare maintenance    -   Primary    Relevant Orders    CBC and Auto Differential    Comprehensive Metabolic Panel    Hemoglobin A1C    Lipid Panel    Thyroid Stimulating Hormone    Thyroxine, Free    Prostate Specific Antigen, Screen    Vitamin D 25 hydroxy    Cigarette nicotine dependence without complication        Relevant Medications    atorvastatin (Lipitor) 10 mg tablet    At risk for impaired health maintenance        Relevant Orders    CBC and Auto Differential    Vitamin D deficiency        Relevant Orders    Vitamin D 25 hydroxy    Instability of right ankle joint        Status post surgery x 2    Relevant Orders    Referral to Podiatry          Tylenol as needed for pain    Radiation oncology recommendations noted follow-up 6 months PSA 6 months    Screening blood work due March thousand 24    Thank you for making appointment today Kaleb    Please follow-up 6 months      During the course of the visit the patient was educated and counseled about age appropriate screening and preventive services. Completed preventive screenings were documented in the chart and orders were placed for outstanding screenings/procedures as documented in the Assessment and Plan.      Patient Instructions (the written plan) was given to the patient at check out.      Alfa Pro, DO

## 2024-02-13 ENCOUNTER — OFFICE VISIT (OUTPATIENT)
Dept: CARDIOLOGY | Facility: CLINIC | Age: 79
End: 2024-02-13
Payer: MEDICARE

## 2024-02-13 VITALS
BODY MASS INDEX: 27.97 KG/M2 | WEIGHT: 174 LBS | DIASTOLIC BLOOD PRESSURE: 80 MMHG | SYSTOLIC BLOOD PRESSURE: 140 MMHG | HEIGHT: 66 IN | OXYGEN SATURATION: 97 % | HEART RATE: 77 BPM

## 2024-02-13 DIAGNOSIS — R93.1 AGATSTON CORONARY ARTERY CALCIUM SCORE GREATER THAN 400: ICD-10-CM

## 2024-02-13 DIAGNOSIS — R06.09 DOE (DYSPNEA ON EXERTION): ICD-10-CM

## 2024-02-13 DIAGNOSIS — I25.10 CORONARY ARTERY DISEASE INVOLVING NATIVE CORONARY ARTERY OF NATIVE HEART WITHOUT ANGINA PECTORIS: Primary | ICD-10-CM

## 2024-02-13 DIAGNOSIS — I73.9 CLAUDICATION, INTERMITTENT (CMS-HCC): ICD-10-CM

## 2024-02-13 DIAGNOSIS — E78.00 PURE HYPERCHOLESTEROLEMIA: ICD-10-CM

## 2024-02-13 PROCEDURE — 1036F TOBACCO NON-USER: CPT | Performed by: STUDENT IN AN ORGANIZED HEALTH CARE EDUCATION/TRAINING PROGRAM

## 2024-02-13 PROCEDURE — 1159F MED LIST DOCD IN RCRD: CPT | Performed by: STUDENT IN AN ORGANIZED HEALTH CARE EDUCATION/TRAINING PROGRAM

## 2024-02-13 PROCEDURE — 99214 OFFICE O/P EST MOD 30 MIN: CPT | Performed by: STUDENT IN AN ORGANIZED HEALTH CARE EDUCATION/TRAINING PROGRAM

## 2024-02-13 NOTE — PATIENT INSTRUCTIONS
Please continue current cardiac medication including aspirin 81 mg atorvastatin 10 mg daily.     Please followup with me in Cardiology clinic within the next 1 year.  Please return to clinic sooner or seek emergent care if your symptoms reoccur or worsen.  
No

## 2024-02-13 NOTE — PROGRESS NOTES
Follow-up     HPI:    Kaleb Cole is a 78 y.o. male with pertinent history of ankle injury, remote smoking history, history of prostate cancer status postradiation, lipidemia, elevated coronary calcium score of 650 performed 10/20/2022, normal exercise PVRs performed 4/27/2023, preserved ejection fraction on echo performed 5/11/2023, no ischemia nuclear stress test performed 5/26/2023 presents to cardiology clinic for follow-up.     He is doing relatively well.   No significant chest discomfort.  Dyspnea exertion stable.  He has had minimal lower extremity edema.  We reviewed and discussed his prior echo and stress test results.  No exacerbating or relieving factors.  Patient denies chest pain and angina.  Pt denies orthopnea, and paroxysmal nocturnal dyspnea.  Pt denies worsening lower extremity edema.  Pt denies palpitations or syncope.  No recent falls.  No fever or chills.  No cough.  No change in bowel or bladder habits.  No sick contacts.  No recent travel.    12 point review of systems including (Constitutional, Eyes, ENMT, Respiratory, Cardiac, Gastrointestinal, Neurological, Psychiatric, and Hematologic) was performed and is otherwise negative.    Past medical history reviewed:   has a past medical history of Bilateral knee pain (12/12/2023), Body mass index (BMI) 27.0-27.9, adult, Carpal tunnel syndrome, left upper limb (05/25/2021), Edema of lower extremity (12/12/2023), and Personal history of other diseases of the circulatory system (02/13/2014).    Past surgical history reviewed:   has a past surgical history that includes Knee arthroscopy w/ debridement (02/06/2015); Other surgical history (02/06/2015); and Ankle arthroscopy w/ open repair (08/12/2016).    Social history reviewed:   reports that he has quit smoking. His smoking use included cigarettes. He has quit using smokeless tobacco. He reports that he does not use drugs. No history on file for alcohol use.     Family history reviewed:     Family History   Problem Relation Name Age of Onset    Lung cancer Father         Allergies reviewed: Patient has no known allergies.     Medications reviewed:   Current Outpatient Medications   Medication Instructions    aspirin 81 mg chewable tablet 1 tablet, oral, Daily    atorvastatin (LIPITOR) 10 mg, oral, Daily    multivitamin tablet oral    naproxen sodium 220 mg capsule oral        Vitals reviewed: Visit Vitals  /80   Pulse 77       Physical Exam:   General:  Patient is awake, alert, and oriented.  Patient is in no acute distress.  HEENT:  Pupils equal and reactive.  Normocephalic.  Moist mucosa.    Neck:  No thyromegaly.  Normal Jugular Venous Pressure.  Cardiovascular:  Regular rate and rhythm.  Normal S1 and S2.  1/6 RENÉ.  Pulmonary:  Clear to auscultation bilaterally.  Abdomen:  Soft. Non-tender.   Non-distended.  Positive bowel sounds.  Lower Extremities:  2+ pedal pulses. No LE edema.  Neurologic:  Cranial nerves intact.  No focal deficit.   Skin: Skin warm and dry, normal skin turgor.   Psychiatric: Normal affect.    Last Labs:  CBC -      Lab Results   Component Value Date    WBC 8.8 03/20/2023    HGB 12.7 (L) 03/20/2023    HCT 40.0 (L) 03/20/2023     03/20/2023        CMP-  Lab Results   Component Value Date    GLUCOSE 87 03/20/2023     03/20/2023    K 4.3 03/20/2023     03/20/2023    CO2 28 03/20/2023    ANIONGAP 13 03/20/2023    BUN 14 03/20/2023    CREATININE 0.71 03/20/2023    CALCIUM 9.0 03/20/2023    PHOS 3.4 09/26/2019    PROT 6.1 (L) 03/20/2023    ALBUMIN 3.9 03/20/2023    AST 15 03/20/2023    ALT 13 03/20/2023    ALKPHOS 46 03/20/2023    BILITOT 0.4 03/20/2023        LIPIDS-  Lab Results   Component Value Date    CHOL 133 03/20/2023    TRIG 63 03/20/2023    HDL 66.3 03/20/2023    CHHDL 2.0 03/20/2023    VLDL 13 03/20/2023        OTHERS-  Lab Results   Component Value Date    HGBA1C 5.7 (A) 03/20/2023        I personally reviewed the patient's recent vitals, labs,  medications, orders, EKGs, pertinent cardiac imaging/ echocardiography and ischemic evaluations including stress testing.    Assessment and Plan:    Kaleb Cole is a 78 y.o. male with pertinent history of ankle injury, remote smoking history, history of prostate cancer status postradiation, lipidemia, elevated coronary calcium score of 650 performed 10/20/2022, normal exercise PVRs performed 4/27/2023, preserved ejection fraction on echo performed 5/11/2023, no ischemia nuclear stress test performed 5/26/2023 presents to cardiology clinic for follow-up. He is doing relatively well.   No significant chest discomfort.  Dyspnea exertion stable.  He has had minimal lower extremity edema.  We reviewed and discussed his prior echo and stress test results.      Please continue current cardiac medication including aspirin 81 mg atorvastatin 10 mg daily.     Please followup with me in Cardiology clinic within the next 1 year.  Please return to clinic sooner or seek emergent care if your symptoms reoccur or worsen.    Thank you for allowing me to participate in their care.  Please feel free to call me with any further questions or concerns.        Eduard Martinez MD, FACC, RASHEED MACDONALD  Division of Cardiovascular Medicine  Medical Director, [facility] [place] [facility]  Clinical , [facility] [employer]  [email or web address]   Office:  [phone]

## 2024-02-16 ENCOUNTER — OFFICE VISIT (OUTPATIENT)
Dept: PODIATRY | Facility: CLINIC | Age: 79
End: 2024-02-16
Payer: MEDICARE

## 2024-02-16 DIAGNOSIS — M21.171 ACQUIRED HEEL VARUS OF RIGHT FOOT: Primary | ICD-10-CM

## 2024-02-16 DIAGNOSIS — M25.371 INSTABILITY OF RIGHT ANKLE JOINT: ICD-10-CM

## 2024-02-16 PROCEDURE — 1159F MED LIST DOCD IN RCRD: CPT | Performed by: PODIATRIST

## 2024-02-16 PROCEDURE — 99203 OFFICE O/P NEW LOW 30 MIN: CPT | Performed by: PODIATRIST

## 2024-02-16 PROCEDURE — 1036F TOBACCO NON-USER: CPT | Performed by: PODIATRIST

## 2024-02-16 NOTE — PROGRESS NOTES
"Chief Complaint   Patient presents with    Ankle Pain     New Patient is here today with ankle pain right. PCP referred. Dr. Michael did an ankle replace 2015. After his surgery he sent him for AFO to help his ankle instability. Was told he had to have additional surgery to correct the ankle joint, does not want this. Looking for conservative care.  No recent xrays.     C/O R ankle being \"crooked \".  Patient states that his foot has been in this position since his surgery in 2015.  Patient states that he can only weight-bear on the lateral aspect of the foot and the medial aspect does not purchase the ground.  S/P ankle implant 2015.    Patient does wear custom made orthotics which are many years old.  Patient also has a custom AFO.  Patient did buy a hinged OTC ankle brace which he wears with his custom insert and says that 1 is easier to ambulate in than the custom AFO.  Patient has seen multiple orthopedics and podiatrist in the past.  It has been recommended to him to have a revisional arthroplasty of the right ankle or an ankle fusion.  Patient does not want a major surgery of this nature at this time.  Patient states he has a hard time walking, feels unstable and is wondering if there is any other conservative treatments which will help him ambulate better.  He is denying any ankle pain at this time.    PMH, PSx, Medications and allergies reviewed.  ROS negative except for what is stated in HPI.    Phyiscal Exam  Patient alert, oriented, no acute distress    VASC: +2/4 pedal pulses B/L.  CFT brisk all digits.  Feet warm to touch.  (+)hair growth B/L.   Mild LE edema B/L and multiple varicosities.     NEURO: Vibratory intact B/L.  Light touch intact B/L.     DERM:No ulcers, no erythema, no ecchymosis noted B/L.    MUSCULOSKEL: +5/5 muscle strength B/L.    Decreased ankle joint ROM B/L.  No inversion/eversion noted R foot.  Right calcaneus is in a varus position  With ambulation the first metatarsal head of the " right foot does not purchase the ground  Patient ambulates along the lateral column of the right foot    Assessment and Plan  #1  Calcaneal varus  Discussed findings in detail with patient  Discussed in detail with patient that nothing will correct the position of the foot except for a another surgical procedure.  Patient understands this.  Previous x-ray reports and notes reviewed.  Will start orthotic precertification and AFO precertification with insurance  Will contact patient whether he will be casted in office for these devices or will be sent to another facility  Patient is agreeable to this plan  Did discuss topical combination creams for treatment of pain patient declines this at this time and states he is not having pain just has feelings of instability with ambulation.    #2 ankle instability  Treatment as above

## 2024-05-24 ENCOUNTER — LAB (OUTPATIENT)
Dept: LAB | Facility: LAB | Age: 79
End: 2024-05-24
Payer: MEDICARE

## 2024-05-24 DIAGNOSIS — C61 MALIGNANT NEOPLASM OF PROSTATE (MULTI): ICD-10-CM

## 2024-05-24 LAB — PSA SERPL-MCNC: 1.26 NG/ML

## 2024-05-24 PROCEDURE — 84153 ASSAY OF PSA TOTAL: CPT

## 2024-05-24 PROCEDURE — 36415 COLL VENOUS BLD VENIPUNCTURE: CPT

## 2024-05-29 ENCOUNTER — TELEPHONE (OUTPATIENT)
Dept: RADIATION ONCOLOGY | Facility: HOSPITAL | Age: 79
End: 2024-05-29
Payer: MEDICARE

## 2024-05-29 ASSESSMENT — ENCOUNTER SYMPTOMS
BLOOD IN STOOL: 0
FEVER: 0
SHORTNESS OF BREATH: 0
FREQUENCY: 0
ALLERGIC/IMMUNOLOGIC NEGATIVE: 1
CONSTIPATION: 0
UNEXPECTED WEIGHT CHANGE: 0
FATIGUE: 0
DIZZINESS: 0
RECTAL PAIN: 0
PALPITATIONS: 0
DIARRHEA: 0
JOINT SWELLING: 0
ABDOMINAL PAIN: 0
ARTHRALGIAS: 0
BACK PAIN: 0
WEAKNESS: 0
DIFFICULTY URINATING: 0
ANAL BLEEDING: 0
CHEST TIGHTNESS: 0
COUGH: 0
PSYCHIATRIC NEGATIVE: 1
HEMATURIA: 0
DYSURIA: 0

## 2024-05-29 NOTE — PROGRESS NOTES
Cancer synopsis:  Rad/onc: Weisent CNP    77 y/o male w/ adenocarcinoma of the prostate; Stage IIB; favorable intermediate risk    Treatment Dates: 4/3/2020 thru 4/13/2020  Treatment Site: prostate Treatment Dose: 3625 cGy   Treatment Technique: The patient was treated with SBRT using volumetric arc therapy with 6MV photons. MLCs were used to shield adjacent organs at risk. Fiducials and hydrogel spacer were placed to maximally spare the rectum. MRI fusion was performed for  target delineation. CBCT was used for image guidance. The patient received 5 fractions of 725 cGy without ADT.   Treatment Course: The patient tolerated treatment well with only anticipated side effects. He was started on tamsulosin during the course of treatment due to urgency and dysuria with improvement of his symptoms.     Recent images:  Nuclear stress test 05/2023 normal    Other issues:  Gout, carpal tunnel syndrome, lipidemia    History of presenting illness:    Patient ID: [id number]     Kaleb Cole is a 78 y.o. male who presents for his FIR prostate cancer GS 3+4=7, IPSA <10 now s/p RT.    RT Site: Prostate  RT Date: 04/13/2020  Hormone therapy: No  Hot Flushes: Denies  Fatigue: Denies  Bone pain: Denies  RADHA: n/a virtual  ED: Denies changes in erectile function since RT.  ED medications: No  IPSS: n/a virtual  Urinary symptoms: Denies dysuria, hematuria, frequency or urgency. Denies weak stream. Denies incomplete bladder emptying.  Urinary Medications: No  Rectal bleeding: Denies  Colonoscopy: 01/2020, 5 polyps removed, diverticulosis noted, next due now  Other systems: Denies SOB, CP or fever.      Review of systems:  Review of Systems   Constitutional:  Negative for fatigue, fever and unexpected weight change.   Respiratory:  Negative for cough, chest tightness and shortness of breath.    Cardiovascular:  Negative for chest pain, palpitations and leg swelling.   Gastrointestinal:  Negative for abdominal pain, anal bleeding,  blood in stool, constipation, diarrhea and rectal pain.   Endocrine: Negative for cold intolerance, heat intolerance and polyuria.   Genitourinary:  Negative for decreased urine volume, difficulty urinating, dysuria, frequency, hematuria and urgency.   Musculoskeletal:  Negative for arthralgias, back pain, gait problem and joint swelling.   Skin: Negative.    Allergic/Immunologic: Negative.    Neurological:  Negative for dizziness, syncope and weakness.   Psychiatric/Behavioral: Negative.         Past Medical history  Past Medical History:   Diagnosis Date    Bilateral knee pain 12/12/2023    Body mass index (BMI) 27.0-27.9, adult     BMI 27.0-27.9,adult    Carpal tunnel syndrome, left upper limb 05/25/2021    Carpal tunnel syndrome of left wrist    Edema of lower extremity 12/12/2023    Personal history of other diseases of the circulatory system 02/13/2014    Personal history of coronary atherosclerosis        Surgical/family history  Family History   Problem Relation Name Age of Onset    Lung cancer Father        Past Surgical History:   Procedure Laterality Date    ANKLE ARTHROSCOPY W/ OPEN REPAIR  08/12/2016    Ankle Repair    KNEE ARTHROSCOPY W/ DEBRIDEMENT  02/06/2015    Arthroscopy Knee Right    OTHER SURGICAL HISTORY  02/06/2015    Leg Repair        Social History  Tobacco Use: Medium Risk (2/16/2024)    Patient History     Smoking Tobacco Use: Former     Smokeless Tobacco Use: Former     Passive Exposure: Not on file         Current med list:  Current Outpatient Medications   Medication Instructions    aspirin 81 mg chewable tablet 1 tablet, oral, Daily    atorvastatin (LIPITOR) 10 mg, oral, Daily    multivitamin tablet oral    naproxen sodium 220 mg capsule oral        Last recorded vital:  N/a virtual    Physical exam  N/a virtual    Pertinent labs:  Prostate Specific AG   Date/Time Value Ref Range Status   05/24/2024 10:43 AM 1.26 <=4.00 ng/mL Final         Dx:  Problem List Items Addressed This Visit     None  PSA of 1.26 was reviewed and is stable. Review of latent SE including rectal bleeding, hematuria, urinary strictures, ED where reviewed as well as how to contact office if s/s present. Denies latent SE. NCCN guidelines where reviewed and routine FUV of every 3m for first year and every 6m for four years for a total of five years was discussed. Patient verbalized understanding.      Reviewed need for colonoscopy: Plans to have discss with PCP to have done this year    PLAN:  FUV 6m virtual  Labs PSA in 6m  Imaging none  Screenings: Due for colonoscopy  FUV other providers: PCP for routine evals, ortho for repair of ongoing ankle issues,       Please contact office with any concerns:  Christiano Bowser CNP  [phone]

## 2024-05-30 ENCOUNTER — HOSPITAL ENCOUNTER (OUTPATIENT)
Dept: RADIATION ONCOLOGY | Facility: HOSPITAL | Age: 79
Setting detail: RADIATION/ONCOLOGY SERIES
Discharge: HOME | End: 2024-05-30
Payer: MEDICARE

## 2024-05-30 ENCOUNTER — TELEPHONE (OUTPATIENT)
Dept: PODIATRY | Facility: CLINIC | Age: 79
End: 2024-05-30

## 2024-05-30 DIAGNOSIS — C61 MALIGNANT NEOPLASM OF PROSTATE (MULTI): ICD-10-CM

## 2024-05-30 DIAGNOSIS — C61 PROSTATE CANCER (MULTI): Primary | ICD-10-CM

## 2024-05-30 PROCEDURE — 99213 OFFICE O/P EST LOW 20 MIN: CPT

## 2024-05-30 NOTE — TELEPHONE ENCOUNTER
Kenym for the patient that I am happy to help him get his follow up visit scheduled with Dr. Felix.  Left my name and phone number so he can reach us when he has a moment

## 2024-05-31 ENCOUNTER — APPOINTMENT (OUTPATIENT)
Dept: RADIATION ONCOLOGY | Facility: HOSPITAL | Age: 79
End: 2024-05-31
Payer: MEDICARE

## 2024-05-31 NOTE — TELEPHONE ENCOUNTER
Pt called again asking for status of Orthotic.  Per provider's last notes:    Will start orthotic precertification and AFO precertification with insurance  Will contact patient whether he will be casted in office for these devices or will be sent to another facility  Patient is agreeable to this plan    Pt refused to schedule a follow up appt.    Provider to advise.

## 2024-06-07 DIAGNOSIS — F17.210 CIGARETTE NICOTINE DEPENDENCE WITHOUT COMPLICATION: ICD-10-CM

## 2024-06-07 RX ORDER — ATORVASTATIN CALCIUM 10 MG/1
10 TABLET, FILM COATED ORAL DAILY
Qty: 30 TABLET | Refills: 0 | Status: SHIPPED | OUTPATIENT
Start: 2024-06-07

## 2024-07-30 DIAGNOSIS — F17.210 CIGARETTE NICOTINE DEPENDENCE WITHOUT COMPLICATION: ICD-10-CM

## 2024-07-30 RX ORDER — ATORVASTATIN CALCIUM 10 MG/1
10 TABLET, FILM COATED ORAL DAILY
Qty: 30 TABLET | Refills: 0 | Status: SHIPPED | OUTPATIENT
Start: 2024-07-30

## 2024-08-28 ENCOUNTER — APPOINTMENT (OUTPATIENT)
Dept: PRIMARY CARE | Facility: CLINIC | Age: 79
End: 2024-08-28
Payer: MEDICARE

## 2024-08-28 VITALS — WEIGHT: 169 LBS | DIASTOLIC BLOOD PRESSURE: 80 MMHG | BODY MASS INDEX: 27.28 KG/M2 | SYSTOLIC BLOOD PRESSURE: 132 MMHG

## 2024-08-28 DIAGNOSIS — M21.961 ACQUIRED DEFORMITY OF RIGHT ANKLE AND FOOT: ICD-10-CM

## 2024-08-28 DIAGNOSIS — I25.10 CORONARY ARTERY DISEASE INVOLVING NATIVE CORONARY ARTERY OF NATIVE HEART WITHOUT ANGINA PECTORIS: Primary | ICD-10-CM

## 2024-08-28 DIAGNOSIS — F17.210 CIGARETTE NICOTINE DEPENDENCE WITHOUT COMPLICATION: ICD-10-CM

## 2024-08-28 PROCEDURE — 1170F FXNL STATUS ASSESSED: CPT | Performed by: INTERNAL MEDICINE

## 2024-08-28 PROCEDURE — 1123F ACP DISCUSS/DSCN MKR DOCD: CPT | Performed by: INTERNAL MEDICINE

## 2024-08-28 PROCEDURE — G0439 PPPS, SUBSEQ VISIT: HCPCS | Performed by: INTERNAL MEDICINE

## 2024-08-28 PROCEDURE — 1036F TOBACCO NON-USER: CPT | Performed by: INTERNAL MEDICINE

## 2024-08-28 PROCEDURE — 1158F ADVNC CARE PLAN TLK DOCD: CPT | Performed by: INTERNAL MEDICINE

## 2024-08-28 PROCEDURE — 99213 OFFICE O/P EST LOW 20 MIN: CPT | Performed by: INTERNAL MEDICINE

## 2024-08-28 RX ORDER — ATORVASTATIN CALCIUM 10 MG/1
10 TABLET, FILM COATED ORAL DAILY
Qty: 90 TABLET | Refills: 1 | Status: SHIPPED | OUTPATIENT
Start: 2024-08-28 | End: 2024-11-26

## 2024-08-28 ASSESSMENT — ACTIVITIES OF DAILY LIVING (ADL)
DOING_HOUSEWORK: INDEPENDENT
TAKING_MEDICATION: INDEPENDENT
BATHING: INDEPENDENT
MANAGING_FINANCES: INDEPENDENT
DRESSING: INDEPENDENT
GROCERY_SHOPPING: INDEPENDENT

## 2024-08-28 ASSESSMENT — PATIENT HEALTH QUESTIONNAIRE - PHQ9
SUM OF ALL RESPONSES TO PHQ9 QUESTIONS 1 AND 2: 0
1. LITTLE INTEREST OR PLEASURE IN DOING THINGS: NOT AT ALL
2. FEELING DOWN, DEPRESSED OR HOPELESS: NOT AT ALL

## 2024-08-28 NOTE — PROGRESS NOTES
"Subjective   Patient ID: Kaleb Cole is a 79 y.o. male who presents for Follow-up, Med Refill, and Medicare Annual Wellness Visit Subsequent.  HPI  Chief Complaint: Medicare Wellness Exam/Comprehensive Problem Focused Follow Up and Physical Exam    HPI:  male with a PMH of Prostate adenocarcinoma stage IIB status post radiation therapy, HLD, osteoarthritis, and CAD.  Right ankle instability status post surgery x 2  Status post left carpal tunnel surgery 2021    Patient overall doing okay aside from chronic right ankle instability for many years grade 8 out of 10 worse after surgery x 2 the day and go as planned unfortunately he is using orthotic in the past but may need a new one  He states the podiatry team\" never followed up with him regarding the orthotics, communication was not good with the office\"  Gait instability  Denies swelling redness paralysis paresthesias    Active Problem List      Comprehensive Medical/Surgical/Social/Family History  Past Medical History:   Diagnosis Date    Bilateral knee pain 12/12/2023    Body mass index (BMI) 27.0-27.9, adult     BMI 27.0-27.9,adult    Carpal tunnel syndrome, left upper limb 05/25/2021    Carpal tunnel syndrome of left wrist    Edema of lower extremity 12/12/2023    Personal history of other diseases of the circulatory system 02/13/2014    Personal history of coronary atherosclerosis     Past Surgical History:   Procedure Laterality Date    ANKLE ARTHROSCOPY W/ OPEN REPAIR  08/12/2016    Ankle Repair    KNEE ARTHROSCOPY W/ DEBRIDEMENT  02/06/2015    Arthroscopy Knee Right    OTHER SURGICAL HISTORY  02/06/2015    Leg Repair     Social History     Social History Narrative    Not on file         Allergies and Medications  Patient has no known allergies.  Current Outpatient Medications on File Prior to Visit   Medication Sig Dispense Refill    aspirin 81 mg chewable tablet Chew 1 tablet (81 mg) once daily.      multivitamin tablet Take by mouth.      naproxen " "sodium 220 mg capsule Take by mouth.      [DISCONTINUED] atorvastatin (Lipitor) 10 mg tablet Take 1 tablet (10 mg) by mouth once daily. Take 1 tablet by mouth once daily  NEEDS APPT FOR FURTHER REFILLS 30 tablet 0     No current facility-administered medications on file prior to visit.       Medications and Supplements  prescribed by me and other practitioners or clinical pharmacist (such as prescriptions, OTC's, herbal therapies and supplements) were reviewed and documented in the medical record.    Tobacco/Alcohol/Opioid use, as well as Illicit Drug Use was screened for/reviewed and documented in Social History section and medication list as appropriate  Activities of Daily Living  In your present state of health, do you have any difficulty performing the following activities?:   Preparing food and eating?: No  Bathing yourself: No  Getting dressed: No  Using the toilet:No  Moving around from place to place: No  In the past year have you fallen or had a near fall?:No    Depression Screen  (Note: if answer to either of the following is \"Yes\", then a more complete depression screening is indicated)   Q1: Over the past two weeks, have you felt down, depressed or hopeless? No  Q2: Over the past two weeks, have you felt little interest or pleasure in doing things? No    Current exercise habits: The patient does not participate in regular exercise at present.   Dietary issues discussed: Yes  Hearing difficulties: No  Safe in current home environment: yes  Visual Acuity assessed: no  Cognitive Impairment assessed: no       Advance directives  Advanced Care Planning (including a Living Will, Healthcare POA, as well as specific end of life choices and/or directives), was discussed for approximately 16 minutes with the patient and/or surrogate, voluntarily, and documented in the medical record.     Cardiac Risk Assessment  Cardiovascular risk was discussed and, if needed, lifestyle modifications recommended, including " nutritional choices, exercise, and elimination of habits contributing to risk. We agreed on a plan to reduce the current cardiovascular risk based on above discussion as needed.  Aspirin use/disuse was discussed after reviewing the updated guidelines below:    Consider low dose Aspirin ( mg) use if the benefit for cardiovascular disease prevention outweighs risk for bleeding complications.   In general, low dose ASA should be considered:  In patients WITHOUT prior MI/stroke/PAD (primary prevention):   a. Age <60: Use if 10-year cardiovascular disease risk >20%, with discussion of risks and benefits with patient  b. Age 60-<70: Use if 10-year cardiovascular disease risk >20% and low bleeding (e.g., gastrointenstinal) risk, with discussion of risks and benefits with patient  c. Age >=70: Do not use    In patients WITH prior MI/stroke/PAD (secondary prevention):   Generally use unless extremely high bleeding (e.g., gastrointenstinal) risk, with discussion of risks and benefits with patient    ROS otherwise negative aside from what was mentioned above in HPI.  Health Maintenance  Colonoscopy: []  Mammogram: []  Pap smear/Pelvic Exam: []  DEXA: []  Low dose chest CT: []   Screening Abdominal US: []  Opthalmology: []  Podiatry: []    Immunizations  Influenza: []  Pneumovax: []  Prevnar 13: []  Td/Tdap: []  Zostavax: []            ROS:  Constitutional: [] denies fever/night sweats/weight loss/fatigue/insomnia  Head: [] denies trauma/headache/masses  Eyes: [] denies loss of vision/blurry vision/diplopia/redness/eye pain  Ears: [] denies hearing loss/tinnitus/masses/pain/otorrhea  Nose: [] denies anosmia/masses/epistaxis/drainage  Throat: [] denies dysphagia/odynophagia  Neck: [] denies masses/asymmetry  Lymphatics: [] denies lymph node swelling  Cardiovascular: [] denies CP/orthopnea/PND/leg edema/palpitations  Pulmonary: [] denies SOB/dyspnea with exertion/cough/sputum production/hemoptysis/wheezing  GI: [] denies  abdominal pain/nausea/vomiting/dysphagia/odynophagia/melena/hematochezia/diarrhea/constipation/change in stool caliber/bowel incontinence  : [] denies dysuria/hematuria/increased frequency/nocturia/dribbling/urinary incontinence  Genital: [] denies discharge/masses/lesions  Musculoskeletal: [Chronic right ankle instability deformity hard to walk he feels like he is out worn his present orthotics they no longer work because the ankle is change in his ankle; occasional stiffness of the hands] denies trauma/arthralgia/myalgia/deformity/joint swelling  Hematologic: [] denies easy bruising or bleeding  Neurologic: [] denies gait imbalance/paresthesias/saddle paresthesia/focal weakness/dysarthria/seizure  Skin: [] denies masses/lesions/rashes/tattoos  Psychiatric: [] denies depression/suicidal or homicidal thoughts    Vitals  Vitals:    08/28/24 1147   BP: 132/80     Body mass index is 27.28 kg/m².  Physical Exam  Gen: Alert, NAD  HEENT:  PERRLA, EOMI, conjunctiva and sclera normal in appearance. External auditory canals/TMs normal; Oral cavity and posterior pharynx without lesions/exudate  Neck:  Supple with FROM; No masses/nodes palpable; Thyroid nontender and without nodules; No ALTA  Respiratory:  Lungs CTAB  Cardiovascular:  Heart RRR. No M/R/G. Peripheral pulses equal bilaterally  Abdomen:  Soft, nontender, BS present throughout; No R/G/R; No HSM or masses palpated  Extremities: Right ankle inversion 30 degrees ; severely antalgic gait FROM all extremities; Muscle strength grossly normal with good tone 2+ palpable DP PT bilateral lower extremity  Neuro:  CN II-XII intact; Reflexes 2+/2+; Gross motor and sensory intact  Skin:  No suspicious lesions present  Breast: No masses, skin lesions or nipple discharges, no axillary lymphadenopathy    Assessment and Plan:  Problem List Items Addressed This Visit       Acquired deformity of right ankle and foot    CAD (coronary artery disease) - Primary     Other Visit  Diagnoses       Cigarette nicotine dependence without complication        Relevant Medications    atorvastatin (Lipitor) 10 mg tablet          Call and follow-up with podiatry recommendations noted orthotic AFOs, surgical fix would be needed  Message sent to Dr Felix to coordinate office orthotics through podiatry    Cardiology recommendations noted appreciated continue current medications follow-up in 1 year    Tylenol as needed for pain    Radiation oncology recommendations noted follow-up 6 months PSA 6 months    Call to complete blood work as ordered    Thank you for making appointment today Kaleb    Please follow-up 6 months      During the course of the visit the patient was educated and counseled about age appropriate screening and preventive services. Completed preventive screenings were documented in the chart and orders were placed for outstanding screenings/procedures as documented in the Assessment and Plan.      Patient Instructions (the written plan) was given to the patient at check out.      Alfa Pro DO              Health Maintenance:      Colonoscopy:      Mammogram:      Pelvic/Pap:      Low dose chest CT:      Aorta duplex:      Optho:      Podiatry:        Vaccines:      Refer to Epic Vaccination Log                Objective   /80   Wt 76.7 kg (169 lb)   BMI 27.28 kg/m²          Assessment/Plan   Diagnoses and all orders for this visit:  Coronary artery disease involving native coronary artery of native heart without angina pectoris  Cigarette nicotine dependence without complication  -     atorvastatin (Lipitor) 10 mg tablet; Take 1 tablet (10 mg) by mouth once daily. Take 1 tablet by mouth once daily  NEEDS APPT FOR FURTHER REFILLS  Acquired deformity of right ankle and foot           Alfa Pro DO

## 2024-08-30 ENCOUNTER — LAB (OUTPATIENT)
Dept: LAB | Facility: LAB | Age: 79
End: 2024-08-30
Payer: MEDICARE

## 2024-09-03 ENCOUNTER — LAB (OUTPATIENT)
Dept: LAB | Facility: LAB | Age: 79
End: 2024-09-03
Payer: MEDICARE

## 2024-09-03 DIAGNOSIS — Z91.89 AT RISK FOR IMPAIRED HEALTH MAINTENANCE: ICD-10-CM

## 2024-09-03 DIAGNOSIS — Z00.00 HEALTHCARE MAINTENANCE: ICD-10-CM

## 2024-09-03 DIAGNOSIS — E55.9 VITAMIN D DEFICIENCY: ICD-10-CM

## 2024-09-03 LAB
25(OH)D3 SERPL-MCNC: 79 NG/ML (ref 30–100)
ALBUMIN SERPL BCP-MCNC: 4.7 G/DL (ref 3.4–5)
ALP SERPL-CCNC: 49 U/L (ref 33–136)
ALT SERPL W P-5'-P-CCNC: 30 U/L (ref 10–52)
ANION GAP SERPL CALC-SCNC: 17 MMOL/L (ref 10–20)
AST SERPL W P-5'-P-CCNC: 32 U/L (ref 9–39)
BASOPHILS # BLD AUTO: 0.05 X10*3/UL (ref 0–0.1)
BASOPHILS NFR BLD AUTO: 0.6 %
BILIRUB SERPL-MCNC: 1 MG/DL (ref 0–1.2)
BUN SERPL-MCNC: 11 MG/DL (ref 6–23)
CALCIUM SERPL-MCNC: 10 MG/DL (ref 8.6–10.6)
CHLORIDE SERPL-SCNC: 102 MMOL/L (ref 98–107)
CHOLEST SERPL-MCNC: 168 MG/DL (ref 0–199)
CHOLESTEROL/HDL RATIO: 1.7
CO2 SERPL-SCNC: 27 MMOL/L (ref 21–32)
CREAT SERPL-MCNC: 0.7 MG/DL (ref 0.5–1.3)
EGFRCR SERPLBLD CKD-EPI 2021: >90 ML/MIN/1.73M*2
EOSINOPHIL # BLD AUTO: 0.1 X10*3/UL (ref 0–0.4)
EOSINOPHIL NFR BLD AUTO: 1.2 %
ERYTHROCYTE [DISTWIDTH] IN BLOOD BY AUTOMATED COUNT: 14.4 % (ref 11.5–14.5)
EST. AVERAGE GLUCOSE BLD GHB EST-MCNC: 100 MG/DL
GLUCOSE SERPL-MCNC: 67 MG/DL (ref 74–99)
HBA1C MFR BLD: 5.1 %
HCT VFR BLD AUTO: 44.8 % (ref 41–52)
HDLC SERPL-MCNC: 99.1 MG/DL
HGB BLD-MCNC: 14.5 G/DL (ref 13.5–17.5)
IMM GRANULOCYTES # BLD AUTO: 0.02 X10*3/UL (ref 0–0.5)
IMM GRANULOCYTES NFR BLD AUTO: 0.2 % (ref 0–0.9)
LDLC SERPL CALC-MCNC: 59 MG/DL
LYMPHOCYTES # BLD AUTO: 1.62 X10*3/UL (ref 0.8–3)
LYMPHOCYTES NFR BLD AUTO: 19.1 %
MCH RBC QN AUTO: 31.6 PG (ref 26–34)
MCHC RBC AUTO-ENTMCNC: 32.4 G/DL (ref 32–36)
MCV RBC AUTO: 98 FL (ref 80–100)
MONOCYTES # BLD AUTO: 0.56 X10*3/UL (ref 0.05–0.8)
MONOCYTES NFR BLD AUTO: 6.6 %
NEUTROPHILS # BLD AUTO: 6.14 X10*3/UL (ref 1.6–5.5)
NEUTROPHILS NFR BLD AUTO: 72.3 %
NON HDL CHOLESTEROL: 69 MG/DL (ref 0–149)
NRBC BLD-RTO: 0 /100 WBCS (ref 0–0)
PLATELET # BLD AUTO: 302 X10*3/UL (ref 150–450)
POTASSIUM SERPL-SCNC: 3.8 MMOL/L (ref 3.5–5.3)
PROT SERPL-MCNC: 7 G/DL (ref 6.4–8.2)
PSA SERPL-MCNC: 1.03 NG/ML
RBC # BLD AUTO: 4.59 X10*6/UL (ref 4.5–5.9)
SODIUM SERPL-SCNC: 142 MMOL/L (ref 136–145)
T4 FREE SERPL-MCNC: 1.21 NG/DL (ref 0.78–1.48)
TRIGL SERPL-MCNC: 52 MG/DL (ref 0–149)
TSH SERPL-ACNC: 1.95 MIU/L (ref 0.44–3.98)
VLDL: 10 MG/DL (ref 0–40)
WBC # BLD AUTO: 8.5 X10*3/UL (ref 4.4–11.3)

## 2024-09-03 PROCEDURE — 36415 COLL VENOUS BLD VENIPUNCTURE: CPT

## 2024-09-03 PROCEDURE — 84439 ASSAY OF FREE THYROXINE: CPT

## 2024-09-03 PROCEDURE — 84153 ASSAY OF PSA TOTAL: CPT

## 2024-09-03 PROCEDURE — 82306 VITAMIN D 25 HYDROXY: CPT

## 2024-09-03 PROCEDURE — 85025 COMPLETE CBC W/AUTO DIFF WBC: CPT

## 2024-09-03 PROCEDURE — 80053 COMPREHEN METABOLIC PANEL: CPT

## 2024-09-03 PROCEDURE — 83036 HEMOGLOBIN GLYCOSYLATED A1C: CPT

## 2024-09-03 PROCEDURE — 84443 ASSAY THYROID STIM HORMONE: CPT

## 2024-09-03 PROCEDURE — 80061 LIPID PANEL: CPT

## 2024-09-20 ENCOUNTER — TELEPHONE (OUTPATIENT)
Dept: PODIATRY | Facility: CLINIC | Age: 79
End: 2024-09-20
Payer: MEDICARE

## 2024-09-20 NOTE — TELEPHONE ENCOUNTER
Called patient and left message regarding getting a RX for orthotics and helping to get a location outside our office to get these made, Office no longer makes orthotics as of July 1.  Tried calling patient in the past to coordinate, but have been unsuccessful.     Krista Carmen MA

## 2024-11-21 ENCOUNTER — LAB (OUTPATIENT)
Dept: LAB | Facility: LAB | Age: 79
End: 2024-11-21
Payer: MEDICARE

## 2024-11-21 DIAGNOSIS — C61 PROSTATE CANCER (MULTI): ICD-10-CM

## 2024-11-21 LAB — PSA SERPL-MCNC: 1.12 NG/ML

## 2024-11-21 PROCEDURE — 84153 ASSAY OF PSA TOTAL: CPT

## 2024-11-21 PROCEDURE — 36415 COLL VENOUS BLD VENIPUNCTURE: CPT

## 2024-11-26 ASSESSMENT — ENCOUNTER SYMPTOMS
SHORTNESS OF BREATH: 0
HEMATURIA: 0
PSYCHIATRIC NEGATIVE: 1
FREQUENCY: 0
WEAKNESS: 0
RECTAL PAIN: 0
BACK PAIN: 0
COUGH: 0
ARTHRALGIAS: 0
BLOOD IN STOOL: 0
DIZZINESS: 0
CONSTIPATION: 0
CHEST TIGHTNESS: 0
UNEXPECTED WEIGHT CHANGE: 0
FATIGUE: 0
PALPITATIONS: 0
FEVER: 0
JOINT SWELLING: 0
ANAL BLEEDING: 0
DIARRHEA: 0
DYSURIA: 0
ALLERGIC/IMMUNOLOGIC NEGATIVE: 1
DIFFICULTY URINATING: 0
ABDOMINAL PAIN: 0

## 2024-11-26 NOTE — PROGRESS NOTES
Cancer synopsis:  Rad/onc: Chrystal GANT    78 y/o male w/ adenocarcinoma of the prostate; Stage IIB; favorable intermediate risk    Treatment Dates: 4/3/2020 thru 4/13/2020  Treatment Site: prostate Treatment Dose: 3625 cGy   Treatment Technique: The patient was treated with SBRT using volumetric arc therapy with 6MV photons. MLCs were used to shield adjacent organs at risk. Fiducials and hydrogel spacer were placed to maximally spare the rectum. MRI fusion was performed for  target delineation. CBCT was used for image guidance. The patient received 5 fractions of 725 cGy without ADT.   Treatment Course: The patient tolerated treatment well with only anticipated side effects. He was started on tamsulosin during the course of treatment due to urgency and dysuria with improvement of his symptoms.     Recent images:  Nuclear stress test 05/2023 normal    Other issues:  Gout, carpal tunnel syndrome, lipidemia    History of presenting illness:    Patient ID: [id number]     Kaleb Cole is a 79 y.o. male who presents for his FIR prostate cancer GS 3+4=7, IPSA <10 now s/p RT.    RT Site: Prostate  RT Date: 04/13/2020  Hormone therapy: No  Hot Flushes: Denies  Fatigue: Denies  Bone pain: Denies  RADHA: n/a virtual  ED: Denies changes in erectile function since RT.  ED medications: No  IPSS: n/a virtual  Urinary symptoms: Denies dysuria, hematuria, frequency or urgency. Denies weak stream. Denies incomplete bladder emptying.  Urinary Medications: No  Rectal bleeding: Denies  Colonoscopy: 01/2020, 5 polyps removed, diverticulosis noted, next due now  Other systems: Denies SOB, CP or fever.    Review of systems:  Review of Systems   Constitutional:  Negative for fatigue, fever and unexpected weight change.   Respiratory:  Negative for cough, chest tightness and shortness of breath.    Cardiovascular:  Negative for chest pain, palpitations and leg swelling.   Gastrointestinal:  Negative for abdominal pain, anal bleeding,  blood in stool, constipation, diarrhea and rectal pain.   Endocrine: Negative for cold intolerance, heat intolerance and polyuria.   Genitourinary:  Negative for decreased urine volume, difficulty urinating, dysuria, frequency, hematuria and urgency.   Musculoskeletal:  Negative for arthralgias, back pain, gait problem and joint swelling.   Skin: Negative.    Allergic/Immunologic: Negative.    Neurological:  Negative for dizziness, syncope and weakness.   Psychiatric/Behavioral: Negative.       Past Medical history  Past Medical History:   Diagnosis Date    Bilateral knee pain 12/12/2023    Body mass index (BMI) 27.0-27.9, adult     BMI 27.0-27.9,adult    Carpal tunnel syndrome, left upper limb 05/25/2021    Carpal tunnel syndrome of left wrist    Edema of lower extremity 12/12/2023    Personal history of other diseases of the circulatory system 02/13/2014    Personal history of coronary atherosclerosis      Surgical/family history  Family History   Problem Relation Name Age of Onset    Lung cancer Father        Past Surgical History:   Procedure Laterality Date    ANKLE ARTHROSCOPY W/ OPEN REPAIR  08/12/2016    Ankle Repair    KNEE ARTHROSCOPY W/ DEBRIDEMENT  02/06/2015    Arthroscopy Knee Right    OTHER SURGICAL HISTORY  02/06/2015    Leg Repair      Social History  Tobacco Use: Medium Risk (8/28/2024)    Patient History     Smoking Tobacco Use: Former     Smokeless Tobacco Use: Former     Passive Exposure: Not on file       Current med list:  Current Outpatient Medications   Medication Instructions    aspirin 81 mg chewable tablet 1 tablet, oral, Daily    atorvastatin (LIPITOR) 10 mg, oral, Daily, Take 1 tablet by mouth once daily  NEEDS APPT FOR FURTHER REFILLS    multivitamin tablet oral    naproxen sodium 220 mg capsule oral      Last recorded vital:  N/a virtual    Physical exam  N/a virtual    Pertinent labs:  Prostate Specific AG   Date/Time Value Ref Range Status   11/21/2024 01:03 PM 1.12 <=4.00 ng/mL  Final     Dx:  Problem List Items Addressed This Visit       Prostate cancer (Multi)    Relevant Orders    Clinic Appointment Request Follow Up; CHRISTIANO VELASQUEZ; [facility] (Completed)     Other Visit Diagnoses       Malignant neoplasm of prostate (Multi)    -  Primary        PSA of 1.12 was reviewed and is stable. Review of latent SE including rectal bleeding, hematuria, urinary strictures, ED where reviewed as well as how to contact office if s/s present. Denies latent SE. NCCN guidelines where reviewed and routine FUV of every 3m for first year and every 6m for four years for a total of five years was discussed. Patient verbalized understanding.      PLAN:  FUV 6m virtual  Labs PSA in 6m  Imaging none  Screenings: Due for colonoscopy  FUV other providers: PCP for routine evals, ortho for repair of ongoing ankle issues,     Please contact office with any concerns:  Christiano Bowser CNP  [phone]    I performed this visit using realtime telehealth tools, including an audio/video OR telephone connection between patient’s name and location Romanini, Dennies and Christiano Velasquez AOCNP.    2. POS 10: Telehealth provided in patient's home.  o Patient is located in their home (which is a location other than a hospital or other  facility where the patient receives care in a private residence) when receiving  health services or health related services through telecommunication technology.

## 2024-11-27 ENCOUNTER — HOSPITAL ENCOUNTER (OUTPATIENT)
Dept: RADIATION ONCOLOGY | Facility: HOSPITAL | Age: 79
Setting detail: RADIATION/ONCOLOGY SERIES
Discharge: HOME | End: 2024-11-27
Payer: MEDICARE

## 2024-11-27 DIAGNOSIS — C61 MALIGNANT NEOPLASM OF PROSTATE (MULTI): Primary | ICD-10-CM

## 2024-11-27 DIAGNOSIS — C61 PROSTATE CANCER (MULTI): ICD-10-CM

## 2024-11-27 PROCEDURE — 99212 OFFICE O/P EST SF 10 MIN: CPT

## 2024-11-27 PROCEDURE — 99212 OFFICE O/P EST SF 10 MIN: CPT | Mod: 95

## 2025-01-17 DIAGNOSIS — F17.210 CIGARETTE NICOTINE DEPENDENCE WITHOUT COMPLICATION: ICD-10-CM

## 2025-01-17 RX ORDER — ATORVASTATIN CALCIUM 10 MG/1
10 TABLET, FILM COATED ORAL DAILY
Qty: 30 TABLET | Refills: 0 | Status: SHIPPED | OUTPATIENT
Start: 2025-01-17 | End: 2025-04-17

## 2025-02-11 DIAGNOSIS — F17.210 CIGARETTE NICOTINE DEPENDENCE WITHOUT COMPLICATION: ICD-10-CM

## 2025-02-11 RX ORDER — ATORVASTATIN CALCIUM 10 MG/1
10 TABLET, FILM COATED ORAL DAILY
Qty: 30 TABLET | Refills: 0 | Status: SHIPPED | OUTPATIENT
Start: 2025-02-11 | End: 2025-05-12

## 2025-02-13 ENCOUNTER — OFFICE VISIT (OUTPATIENT)
Dept: CARDIOLOGY | Facility: CLINIC | Age: 80
End: 2025-02-13
Payer: MEDICARE

## 2025-02-13 VITALS
OXYGEN SATURATION: 98 % | DIASTOLIC BLOOD PRESSURE: 82 MMHG | WEIGHT: 169 LBS | RESPIRATION RATE: 18 BRPM | SYSTOLIC BLOOD PRESSURE: 130 MMHG | BODY MASS INDEX: 27.16 KG/M2 | HEIGHT: 66 IN | HEART RATE: 81 BPM

## 2025-02-13 DIAGNOSIS — R93.1 AGATSTON CORONARY ARTERY CALCIUM SCORE GREATER THAN 400: ICD-10-CM

## 2025-02-13 DIAGNOSIS — R06.09 DOE (DYSPNEA ON EXERTION): ICD-10-CM

## 2025-02-13 DIAGNOSIS — I73.9 CLAUDICATION, INTERMITTENT (CMS-HCC): ICD-10-CM

## 2025-02-13 DIAGNOSIS — I25.10 CORONARY ARTERY DISEASE INVOLVING NATIVE CORONARY ARTERY OF NATIVE HEART WITHOUT ANGINA PECTORIS: Primary | ICD-10-CM

## 2025-02-13 DIAGNOSIS — E78.00 PURE HYPERCHOLESTEROLEMIA: ICD-10-CM

## 2025-02-13 PROCEDURE — 99214 OFFICE O/P EST MOD 30 MIN: CPT | Performed by: STUDENT IN AN ORGANIZED HEALTH CARE EDUCATION/TRAINING PROGRAM

## 2025-02-13 PROCEDURE — 1159F MED LIST DOCD IN RCRD: CPT | Performed by: STUDENT IN AN ORGANIZED HEALTH CARE EDUCATION/TRAINING PROGRAM

## 2025-02-13 PROCEDURE — 1036F TOBACCO NON-USER: CPT | Performed by: STUDENT IN AN ORGANIZED HEALTH CARE EDUCATION/TRAINING PROGRAM

## 2025-02-13 PROCEDURE — G2211 COMPLEX E/M VISIT ADD ON: HCPCS | Performed by: STUDENT IN AN ORGANIZED HEALTH CARE EDUCATION/TRAINING PROGRAM

## 2025-02-13 PROCEDURE — 1123F ACP DISCUSS/DSCN MKR DOCD: CPT | Performed by: STUDENT IN AN ORGANIZED HEALTH CARE EDUCATION/TRAINING PROGRAM

## 2025-02-13 RX ORDER — NAPROXEN SODIUM 220 MG/1
81 TABLET, FILM COATED ORAL DAILY
Qty: 90 TABLET | Refills: 3 | Status: SHIPPED | OUTPATIENT
Start: 2025-02-13 | End: 2026-02-13

## 2025-02-13 ASSESSMENT — ENCOUNTER SYMPTOMS: DEPRESSION: 0

## 2025-02-13 NOTE — PATIENT INSTRUCTIONS
Please continue current cardiac medication including aspirin 81 mg atorvastatin 10 mg daily.     We will obtain a transthoracic echocardiogram for structural evaluation including ejection fraction, assessment of regional wall motion abnormalities or valvular disease, and further evaluation of hemodynamics prior to your follow-up visit.    Please followup with me in Cardiology clinic within the next 1 year.  Please return to clinic sooner or seek emergent care if your symptoms reoccur or worsen.

## 2025-02-13 NOTE — PROGRESS NOTES
Follow-up and Coronary Artery Disease     HPI:    Kaleb Cole is a 79 y.o. male with pertinent history of ankle injury, remote smoking history, history of prostate cancer status postradiation, lipidemia, elevated coronary calcium score of 650 performed 10/20/2022, normal exercise PVRs performed 4/27/2023, preserved ejection fraction on echo performed 5/11/2023, no ischemia nuclear stress test performed 5/26/2023 presents to cardiology clinic for follow-up.     He is doing relatively well.   No significant chest discomfort.  Dyspnea exertion stable.  He has had minimal lower extremity edema.  He stays relatively active walking around his neighborhood.  He does note that he stopped taking aspirin 81 mg and we did discuss the benefits of aspirin with his elevated coronary calcium score.  We reviewed and discussed his prior echo and stress test results.  No exacerbating or relieving factors.  Patient denies chest pain and angina.  Pt denies orthopnea, and paroxysmal nocturnal dyspnea.  Pt denies worsening lower extremity edema.  Pt denies palpitations or syncope.  No recent falls.  No fever or chills.  No cough.  No change in bowel or bladder habits.  No sick contacts.  No recent travel.    12 point review of systems including (Constitutional, Eyes, ENMT, Respiratory, Cardiac, Gastrointestinal, Neurological, Psychiatric, and Hematologic) was performed and is otherwise negative.    Past medical history reviewed:   has a past medical history of Bilateral knee pain (12/12/2023), Body mass index (BMI) 27.0-27.9, adult, Carpal tunnel syndrome, left upper limb (05/25/2021), Edema of lower extremity (12/12/2023), and Personal history of other diseases of the circulatory system (02/13/2014).    Past surgical history reviewed:   has a past surgical history that includes Knee arthroscopy w/ debridement (02/06/2015); Other surgical history (02/06/2015); and Ankle arthroscopy w/ open repair (08/12/2016).    Social history  reviewed:   reports that he quit smoking about 15 years ago. His smoking use included cigarettes. He has quit using smokeless tobacco. He reports current alcohol use of about 7.0 - 14.0 standard drinks of alcohol per week. He reports that he does not use drugs.     Family history reviewed:    Family History   Problem Relation Name Age of Onset    Lung cancer Father         Allergies reviewed: Patient has no known allergies.     Medications reviewed:   Current Outpatient Medications   Medication Instructions    aspirin 81 mg chewable tablet 1 tablet, Daily    atorvastatin (LIPITOR) 10 mg, oral, Daily, Take 1 tablet by mouth once daily  NEEDS APPT FOR FURTHER REFILLS    multivitamin tablet Take by mouth.    naproxen sodium 220 mg capsule Take by mouth.        Vitals reviewed: Visit Vitals  /82 (BP Location: Left arm, Patient Position: Sitting, BP Cuff Size: Adult)   Pulse 81   Resp 18       Physical Exam:   General:  Patient is awake, alert, and oriented.  Patient is in no acute distress.  HEENT:  Pupils equal and reactive.  Normocephalic.  Moist mucosa.    Neck:  No thyromegaly.  Normal Jugular Venous Pressure.  Cardiovascular:  Regular rate and rhythm.  Normal S1 and S2.  1/6 RENÉ.  Pulmonary:  Clear to auscultation bilaterally.  Abdomen:  Soft. Non-tender.   Non-distended.  Positive bowel sounds.  Lower Extremities:  2+ pedal pulses. No LE edema.  Neurologic:  Cranial nerves intact.  No focal deficit.   Skin: Skin warm and dry, normal skin turgor.   Psychiatric: Normal affect.    Last Labs:  CBC -      Lab Results   Component Value Date    WBC 8.5 09/03/2024    HGB 14.5 09/03/2024    HCT 44.8 09/03/2024     09/03/2024        CMP-  Lab Results   Component Value Date    GLUCOSE 67 (L) 09/03/2024     09/03/2024    K 3.8 09/03/2024     09/03/2024    CO2 27 09/03/2024    ANIONGAP 17 09/03/2024    BUN 11 09/03/2024    CREATININE 0.70 09/03/2024    EGFR >90 09/03/2024    CALCIUM 10.0 09/03/2024     PHOS 3.4 09/26/2019    PROT 7.0 09/03/2024    ALBUMIN 4.7 09/03/2024    AST 32 09/03/2024    ALT 30 09/03/2024    ALKPHOS 49 09/03/2024    BILITOT 1.0 09/03/2024        LIPIDS-  Lab Results   Component Value Date    CHOL 168 09/03/2024    TRIG 52 09/03/2024    HDL 99.1 09/03/2024    CHHDL 1.7 09/03/2024    VLDL 10 09/03/2024        OTHERS-  Lab Results   Component Value Date    HGBA1C 5.1 09/03/2024        I personally reviewed the patient's recent vitals, labs, medications, orders, EKGs, pertinent cardiac imaging/ echocardiography and ischemic evaluations including stress testing.    Assessment and Plan:    Kaleb Cole is a 79 y.o. male with pertinent history of ankle injury, remote smoking history, history of prostate cancer status postradiation, lipidemia, elevated coronary calcium score of 650 performed 10/20/2022, normal exercise PVRs performed 4/27/2023, preserved ejection fraction on echo performed 5/11/2023, no ischemia nuclear stress test performed 5/26/2023 presents to cardiology clinic for follow-up. He is doing relatively well.   No significant chest discomfort.  Dyspnea exertion stable.  He has had minimal lower extremity edema.  He stays relatively active walking around his neighborhood.  He does note that he stopped taking aspirin 81 mg and we did discuss the benefits of aspirin with his elevated coronary calcium score.  We reviewed and discussed his prior echo and stress test results.      Please continue current cardiac medication including aspirin 81 mg atorvastatin 10 mg daily.     We will obtain a transthoracic echocardiogram for structural evaluation including ejection fraction, assessment of regional wall motion abnormalities or valvular disease, and further evaluation of hemodynamics prior to your follow-up visit.    Please followup with me in Cardiology clinic within the next 1 year.  Please return to clinic sooner or seek emergent care if your symptoms reoccur or worsen.    Thank you  for allowing me to participate in their care.  Please feel free to call me with any further questions or concerns.        Eduard Martinez MD, FACC, RASHE[facility] MACDONALD  Division of Cardiovascular Medicine  Medical Director, [facility] [place]  Clinical , [employer] [employer]  [email or web address]   Office:  [phone]

## 2025-03-06 ENCOUNTER — APPOINTMENT (OUTPATIENT)
Dept: PRIMARY CARE | Facility: CLINIC | Age: 80
End: 2025-03-06
Payer: MEDICARE

## 2025-03-06 VITALS
BODY MASS INDEX: 27.64 KG/M2 | DIASTOLIC BLOOD PRESSURE: 82 MMHG | HEIGHT: 66 IN | WEIGHT: 172 LBS | SYSTOLIC BLOOD PRESSURE: 132 MMHG

## 2025-03-06 DIAGNOSIS — C61 MALIGNANT NEOPLASM OF PROSTATE (MULTI): ICD-10-CM

## 2025-03-06 DIAGNOSIS — F17.210 CIGARETTE NICOTINE DEPENDENCE WITHOUT COMPLICATION: ICD-10-CM

## 2025-03-06 DIAGNOSIS — R26.81 GAIT INSTABILITY: Primary | ICD-10-CM

## 2025-03-06 PROCEDURE — 1123F ACP DISCUSS/DSCN MKR DOCD: CPT | Performed by: INTERNAL MEDICINE

## 2025-03-06 PROCEDURE — 99213 OFFICE O/P EST LOW 20 MIN: CPT | Performed by: INTERNAL MEDICINE

## 2025-03-06 PROCEDURE — 1159F MED LIST DOCD IN RCRD: CPT | Performed by: INTERNAL MEDICINE

## 2025-03-06 PROCEDURE — 1160F RVW MEDS BY RX/DR IN RCRD: CPT | Performed by: INTERNAL MEDICINE

## 2025-03-06 PROCEDURE — 1036F TOBACCO NON-USER: CPT | Performed by: INTERNAL MEDICINE

## 2025-03-06 RX ORDER — ATORVASTATIN CALCIUM 10 MG/1
10 TABLET, FILM COATED ORAL DAILY
Qty: 90 TABLET | Refills: 1 | Status: SHIPPED | OUTPATIENT
Start: 2025-03-06 | End: 2025-09-02

## 2025-03-06 NOTE — PROGRESS NOTES
Subjective   Patient ID: Kaleb Cole is a 79 y.o. male who presents for Follow-up.  HPI  Chief Complaint:     HPI:  male with a PMH of Prostate adenocarcinoma stage IIB status post radiation therapy, HLD, osteoarthritis, and CAD.  Right ankle instability status post surgery x 2  Status post left carpal tunnel surgery 2021    Patient overall doing okay aside from chronic right ankle instability for many years grade 8 out of 10 worse after surgery x 2 the day and go as planned unfortunately he is using orthotic in the past but may need a new one states he tried calling [facility] but they wanted to charge him $400 on top of Medicare and he never had to do this before, then the podiatry office called him stating they do not do orthotics anymore  Gait instability  Denies swelling redness paralysis paresthesias    Active Problem List      Comprehensive Medical/Surgical/Social/Family History  Past Medical History:   Diagnosis Date    Bilateral knee pain 12/12/2023    Body mass index (BMI) 27.0-27.9, adult     BMI 27.0-27.9,adult    Carpal tunnel syndrome, left upper limb 05/25/2021    Carpal tunnel syndrome of left wrist    Edema of lower extremity 12/12/2023    Personal history of other diseases of the circulatory system 02/13/2014    Personal history of coronary atherosclerosis     Past Surgical History:   Procedure Laterality Date    ANKLE ARTHROSCOPY W/ OPEN REPAIR  08/12/2016    Ankle Repair    KNEE ARTHROSCOPY W/ DEBRIDEMENT  02/06/2015    Arthroscopy Knee Right    OTHER SURGICAL HISTORY  02/06/2015    Leg Repair     Social History     Social History Narrative    Not on file         Allergies and Medications  Patient has no known allergies.  Current Outpatient Medications on File Prior to Visit   Medication Sig Dispense Refill    aspirin 81 mg chewable tablet Chew 1 tablet (81 mg) once daily. 90 tablet 3    multivitamin tablet Take by mouth.      [DISCONTINUED] atorvastatin (Lipitor) 10 mg tablet Take 1  "tablet (10 mg) by mouth once daily. Take 1 tablet by mouth once daily  NEEDS APPT FOR FURTHER REFILLS 30 tablet 0     No current facility-administered medications on file prior to visit.       Medications and Supplements  prescribed by me and other practitioners or clinical pharmacist (such as prescriptions, OTC's, herbal therapies and supplements) were reviewed and documented in the medical record.    Tobacco/Alcohol/Opioid use, as well as Illicit Drug Use was screened for/reviewed and documented in Social History section and medication list as appropriate  Activities of Daily Living  In your present state of health, do you have any difficulty performing the following activities?:   Preparing food and eating?: No  Bathing yourself: No  Getting dressed: No  Using the toilet:No  Moving around from place to place: No  In the past year have you fallen or had a near fall?:No    Depression Screen  (Note: if answer to either of the following is \"Yes\", then a more complete depression screening is indicated)   Q1: Over the past two weeks, have you felt down, depressed or hopeless? No  Q2: Over the past two weeks, have you felt little interest or pleasure in doing things? No    Current exercise habits: The patient does not participate in regular exercise at present.   Dietary issues discussed: Yes  Hearing difficulties: No  Safe in current home environment: yes  Visual Acuity assessed: no  Cognitive Impairment assessed: no       Advance directives  Advanced Care Planning (including a Living Will, Healthcare POA, as well as specific end of life choices and/or directives), was discussed for approximately 16 minutes with the patient and/or surrogate, voluntarily, and documented in the medical record.     Cardiac Risk Assessment  Cardiovascular risk was discussed and, if needed, lifestyle modifications recommended, including nutritional choices, exercise, and elimination of habits contributing to risk. We agreed on a plan to " reduce the current cardiovascular risk based on above discussion as needed.  Aspirin use/disuse was discussed after reviewing the updated guidelines below:    Consider low dose Aspirin ( mg) use if the benefit for cardiovascular disease prevention outweighs risk for bleeding complications.   In general, low dose ASA should be considered:  In patients WITHOUT prior MI/stroke/PAD (primary prevention):   a. Age <60: Use if 10-year cardiovascular disease risk >20%, with discussion of risks and benefits with patient  b. Age 60-<70: Use if 10-year cardiovascular disease risk >20% and low bleeding (e.g., gastrointenstinal) risk, with discussion of risks and benefits with patient  c. Age >=70: Do not use    In patients WITH prior MI/stroke/PAD (secondary prevention):   Generally use unless extremely high bleeding (e.g., gastrointenstinal) risk, with discussion of risks and benefits with patient    ROS otherwise negative aside from what was mentioned above in HPI.  Health Maintenance  Colonoscopy: []  Mammogram: []  Pap smear/Pelvic Exam: []  DEXA: []  Low dose chest CT: []   Screening Abdominal US: []  Opthalmology: []  Podiatry: []    Immunizations  Influenza: []  Pneumovax: []  Prevnar 13: []  Td/Tdap: []  Zostavax: []            ROS:  Constitutional: [] denies fever/night sweats/weight loss/fatigue/insomnia  Head: [] denies trauma/headache/masses  Eyes: [] denies loss of vision/blurry vision/diplopia/redness/eye pain  Ears: [] denies hearing loss/tinnitus/masses/pain/otorrhea  Nose: [] denies anosmia/masses/epistaxis/drainage  Throat: [] denies dysphagia/odynophagia  Neck: [] denies masses/asymmetry  Lymphatics: [] denies lymph node swelling  Cardiovascular: [] denies CP/orthopnea/PND/leg edema/palpitations  Pulmonary: [] denies SOB/dyspnea with exertion/cough/sputum production/hemoptysis/wheezing  GI: [] denies abdominal pain/nausea/vomiting/dysphagia/odynophagia/melena/hematochezia/diarrhea/constipation/change in  stool caliber/bowel incontinence  : [] denies dysuria/hematuria/increased frequency/nocturia/dribbling/urinary incontinence  Genital: [] denies discharge/masses/lesions  Musculoskeletal: [Chronic right ankle instability deformity hard to walk he feels like he is out worn his present orthotics they no longer work because the ankle is change in his ankle; occasional stiffness of the hands] denies trauma/arthralgia/myalgia/deformity/joint swelling  Hematologic: [] denies easy bruising or bleeding  Neurologic: [] denies gait imbalance/paresthesias/saddle paresthesia/focal weakness/dysarthria/seizure  Skin: [] denies masses/lesions/rashes/tattoos  Psychiatric: [] denies depression/suicidal or homicidal thoughts    Vitals  Vitals:    03/06/25 1139   BP: 132/82     Body mass index is 27.76 kg/m².  Physical Exam  Gen: Alert, NAD  HEENT:  PERRLA, EOMI, conjunctiva and sclera normal in appearance. External auditory canals/TMs normal; Oral cavity and posterior pharynx without lesions/exudate  Neck:  Supple with FROM; No masses/nodes palpable; Thyroid nontender and without nodules; No ALTA  Respiratory:  Lungs CTAB  Cardiovascular:  Heart RRR. No M/R/G. Peripheral pulses equal bilaterally  Abdomen:  Soft, nontender, BS present throughout; No R/G/R; No HSM or masses palpated  Extremities: Right ankle inversion 30 degrees ; severely antalgic gait FROM all extremities; Muscle strength grossly normal with good tone 2+ palpable DP PT bilateral lower extremity  Neuro:  CN II-XII intact; Reflexes 2+/2+; Gross motor and sensory intact  Skin:  No suspicious lesions present  Breast: No masses, skin lesions or nipple discharges, no axillary lymphadenopathy    Assessment and Plan:  Problem List Items Addressed This Visit       Adenocarcinoma of prostate (Multi)    Current Assessment & Plan     Call and follow-up with urology         Gait instability - Primary     Other Visit Diagnoses       Cigarette nicotine dependence without  "complication        Relevant Medications    atorvastatin (Lipitor) 10 mg tablet          Call and follow-up with podiatry recommendations noted orthotic AFOs, surgical fix would be needed  New referral podiatry request Dr Sushant Arias paper prescription given since you worked with him before and had positive feedback    Cardiology recommendations noted echo aspirin statin follow-up in 1 year    Tylenol as needed for pain    Radiation oncology recommendations noted follow-up 6 months PSA 6 months    Screening blood work due September and 2025    Thank you for making appointment today Kaleb    Please stop at the  to schedule follow-up 6 months as we discussed      During the course of the visit the patient was educated and counseled about age appropriate screening and preventive services. Completed preventive screenings were documented in the chart and orders were placed for outstanding screenings/procedures as documented in the Assessment and Plan.      Patient Instructions (the written plan) was given to the patient at check out.      Alfa Pro DO              Health Maintenance:      Colonoscopy:      Mammogram:      Pelvic/Pap:      Low dose chest CT:      Aorta duplex:      Optho:      Podiatry:        Vaccines:      Refer to Epic Vaccination Log                Objective   /82   Ht 1.676 m (5' 6\")   Wt 78 kg (172 lb)   BMI 27.76 kg/m²          Assessment/Plan   Diagnoses and all orders for this visit:  Gait instability  Cigarette nicotine dependence without complication  -     atorvastatin (Lipitor) 10 mg tablet; Take 1 tablet (10 mg) by mouth once daily. Take 1 tablet by mouth once daily  Malignant neoplasm of prostate (Multi)           Alfa Pro DO  "

## 2025-05-28 ENCOUNTER — LAB (OUTPATIENT)
Dept: LAB | Facility: HOSPITAL | Age: 80
End: 2025-05-28
Payer: MEDICARE

## 2025-05-28 DIAGNOSIS — C61 MALIGNANT NEOPLASM OF PROSTATE (MULTI): Primary | ICD-10-CM

## 2025-05-28 LAB — PSA SERPL-MCNC: 0.98 NG/ML

## 2025-05-28 PROCEDURE — 84153 ASSAY OF PSA TOTAL: CPT

## 2025-05-28 PROCEDURE — 36415 COLL VENOUS BLD VENIPUNCTURE: CPT

## 2025-05-29 ASSESSMENT — ENCOUNTER SYMPTOMS
DYSURIA: 0
DIFFICULTY URINATING: 0
ALLERGIC/IMMUNOLOGIC NEGATIVE: 1
HEMATURIA: 0
SHORTNESS OF BREATH: 0
RECTAL PAIN: 0
JOINT SWELLING: 0
ARTHRALGIAS: 0
PALPITATIONS: 0
DIZZINESS: 0
CHEST TIGHTNESS: 0
FREQUENCY: 0
CONSTIPATION: 0
FATIGUE: 0
PSYCHIATRIC NEGATIVE: 1
WEAKNESS: 0
COUGH: 0
BACK PAIN: 0
DIARRHEA: 0
ABDOMINAL PAIN: 0
UNEXPECTED WEIGHT CHANGE: 0
ANAL BLEEDING: 0
FEVER: 0
BLOOD IN STOOL: 0

## 2025-05-29 NOTE — PROGRESS NOTES
Cancer synopsis:  Rad/onc: Chrystal GANT    80 y/o male w/ adenocarcinoma of the prostate; Stage IIB; favorable intermediate risk    Treatment Dates: 4/3/2020 thru 4/13/2020  Treatment Site: prostate Treatment Dose: 3625 cGy   Treatment Technique: The patient was treated with SBRT using volumetric arc therapy with 6MV photons. MLCs were used to shield adjacent organs at risk. Fiducials and hydrogel spacer were placed to maximally spare the rectum. MRI fusion was performed for  target delineation. CBCT was used for image guidance. The patient received 5 fractions of 725 cGy without ADT.   Treatment Course: The patient tolerated treatment well with only anticipated side effects. He was started on tamsulosin during the course of treatment due to urgency and dysuria with improvement of his symptoms.     Recent images:  Nuclear stress test 05/2023 normal    Other issues:  Gout, carpal tunnel syndrome, lipidemia    History of presenting illness:    Patient ID: [id number]     Kaleb Cole is a 79 y.o. male who presents for his FIR prostate cancer GS 3+4=7, IPSA <10 now s/p RT.    RT Site: Prostate  RT Date: 04/13/2020  Hormone therapy: No  Hot Flushes: Denies  Fatigue: Denies  Bone pain: Denies  RADHA: n/a virtual  ED: Denies changes in erectile function since RT.  ED medications: No  IPSS: n/a virtual  Urinary symptoms: Denies dysuria, hematuria, frequency or urgency. Denies weak stream. Denies incomplete bladder emptying.  Urinary Medications: No  Rectal bleeding: Denies  Colonoscopy: 01/2020, 5 polyps removed, diverticulosis noted, next due now  Other systems: Denies SOB, CP or fever.    Review of systems:  Review of Systems   Constitutional:  Negative for fatigue, fever and unexpected weight change.   Respiratory:  Negative for cough, chest tightness and shortness of breath.    Cardiovascular:  Negative for chest pain, palpitations and leg swelling.   Gastrointestinal:  Negative for abdominal pain, anal bleeding,  blood in stool, constipation, diarrhea and rectal pain.   Endocrine: Negative for cold intolerance, heat intolerance and polyuria.   Genitourinary:  Negative for decreased urine volume, difficulty urinating, dysuria, frequency, hematuria and urgency.   Musculoskeletal:  Negative for arthralgias, back pain, gait problem and joint swelling.   Skin: Negative.    Allergic/Immunologic: Negative.    Neurological:  Negative for dizziness, syncope and weakness.   Psychiatric/Behavioral: Negative.       Past Medical history  Past Medical History:   Diagnosis Date    Bilateral knee pain 12/12/2023    Body mass index (BMI) 27.0-27.9, adult     BMI 27.0-27.9,adult    Carpal tunnel syndrome, left upper limb 05/25/2021    Carpal tunnel syndrome of left wrist    Edema of lower extremity 12/12/2023    Personal history of other diseases of the circulatory system 02/13/2014    Personal history of coronary atherosclerosis      Surgical/family history  Family History   Problem Relation Name Age of Onset    Lung cancer Father        Past Surgical History:   Procedure Laterality Date    ANKLE ARTHROSCOPY W/ OPEN REPAIR  08/12/2016    Ankle Repair    KNEE ARTHROSCOPY W/ DEBRIDEMENT  02/06/2015    Arthroscopy Knee Right    OTHER SURGICAL HISTORY  02/06/2015    Leg Repair      Social History  Tobacco Use: Medium Risk (3/6/2025)    Patient History     Smoking Tobacco Use: Former     Smokeless Tobacco Use: Former     Passive Exposure: Not on file       Current med list:  Current Outpatient Medications   Medication Instructions    aspirin 81 mg, oral, Daily    atorvastatin (LIPITOR) 10 mg, oral, Daily, Take 1 tablet by mouth once daily    multivitamin tablet Take by mouth.      Last recorded vital:  N/a virtual    Physical exam  N/a virtual    Pertinent labs:  Prostate Specific AG   Date/Time Value Ref Range Status   05/28/2025 01:10 PM 0.98 <=4.00 ng/mL Final     Dx:  Problem List Items Addressed This Visit    None  Visit Diagnoses          Malignant neoplasm of prostate (Multi)    -  Primary    Relevant Orders    Prostate Specific Antigen    Clinic Appointment Request Follow Up; CHRISTIANO VELASQUEZ (virtual); [facility] [address] [facility] (virtual)    Clinic Appointment Request Follow Up; CHRISTIANO VELASQUEZ (virtual); [facility] [address] (virtual) (Completed)        PSA of 0.98 was reviewed and is declining nicely. Review of latent SE including rectal bleeding, hematuria, urinary strictures, ED where reviewed as well as how to contact office if s/s present. Denies latent SE. NCCN guidelines where reviewed and routine FUV of every 3m for first year and every 6m for four years for a total of five years was discussed. Patient verbalized understanding.      PLAN:  FUV 6m virtual  Labs PSA in 6m  Imaging none  Screenings: Due for colonoscopy  FUV other providers: PCP for routine evals, ortho for repair of ongoing ankle issues,     Please contact office with any concerns:  Christiano Bowser CNP  [phone]    I performed this visit using realtime telehealth tools, including an audio/video OR telephone connection between patient’s name and location Roman[place], [place]enn[place] and Christiano Velasquez AOCNP.    2. POS 10: Telehealth provided in patient's home.  o Patient is located in their home (which is a location other than a hospital or other  facility where the patient receives care in a private residence) when receiving  health services or health related services through telecommunication technology.

## 2025-05-30 ENCOUNTER — HOSPITAL ENCOUNTER (OUTPATIENT)
Dept: RADIATION ONCOLOGY | Facility: HOSPITAL | Age: 80
Setting detail: RADIATION/ONCOLOGY SERIES
Discharge: HOME | End: 2025-05-30
Payer: MEDICARE

## 2025-05-30 DIAGNOSIS — C61 MALIGNANT NEOPLASM OF PROSTATE (MULTI): Primary | ICD-10-CM

## 2025-05-30 PROCEDURE — 99213 OFFICE O/P EST LOW 20 MIN: CPT

## 2025-05-30 PROCEDURE — 99213 OFFICE O/P EST LOW 20 MIN: CPT | Mod: 95

## 2026-02-13 ENCOUNTER — APPOINTMENT (OUTPATIENT)
Dept: CARDIOLOGY | Facility: CLINIC | Age: 81
End: 2026-02-13
Payer: MEDICARE